# Patient Record
Sex: MALE | Race: WHITE | NOT HISPANIC OR LATINO
[De-identification: names, ages, dates, MRNs, and addresses within clinical notes are randomized per-mention and may not be internally consistent; named-entity substitution may affect disease eponyms.]

---

## 2019-09-13 ENCOUNTER — APPOINTMENT (OUTPATIENT)
Dept: INTERNAL MEDICINE | Facility: CLINIC | Age: 64
End: 2019-09-13
Payer: MEDICARE

## 2019-09-13 ENCOUNTER — RX RENEWAL (OUTPATIENT)
Age: 64
End: 2019-09-13

## 2019-09-13 ENCOUNTER — NON-APPOINTMENT (OUTPATIENT)
Age: 64
End: 2019-09-13

## 2019-09-13 VITALS
HEIGHT: 65 IN | TEMPERATURE: 97.9 F | WEIGHT: 174 LBS | HEART RATE: 87 BPM | RESPIRATION RATE: 16 BRPM | BODY MASS INDEX: 28.99 KG/M2 | OXYGEN SATURATION: 98 %

## 2019-09-13 DIAGNOSIS — Z82.61 FAMILY HISTORY OF ARTHRITIS: ICD-10-CM

## 2019-09-13 DIAGNOSIS — Z80.51 FAMILY HISTORY OF MALIGNANT NEOPLASM OF KIDNEY: ICD-10-CM

## 2019-09-13 PROCEDURE — 93000 ELECTROCARDIOGRAM COMPLETE: CPT

## 2019-09-13 PROCEDURE — G0444 DEPRESSION SCREEN ANNUAL: CPT | Mod: 59

## 2019-09-13 PROCEDURE — 36415 COLL VENOUS BLD VENIPUNCTURE: CPT

## 2019-09-13 PROCEDURE — G0439: CPT

## 2019-09-13 PROCEDURE — G0402 INITIAL PREVENTIVE EXAM: CPT

## 2019-09-13 PROCEDURE — G0403: CPT

## 2019-09-13 RX ORDER — OMEPRAZOLE 40 MG/1
CAPSULE, DELAYED RELEASE ORAL
Refills: 0 | Status: ACTIVE | COMMUNITY

## 2019-09-13 NOTE — HISTORY OF PRESENT ILLNESS
[FreeTextEntry1] : cpx  [de-identified] : fasting glu's in 200's--was seeing endo ([facility])--last hba1c 7.7--prev 8-10--prior ETT 1 yrs ago for elevated cor philip score--uses novolog 14-16 units  ac meals and 50units lantus qhs--gets occ overnight hypoglycemia--see's podiatry ([facility])--on insulin since 1999

## 2019-09-13 NOTE — PHYSICAL EXAM
[No Acute Distress] : no acute distress [Well Nourished] : well nourished [Well Developed] : well developed [Normal Sclera/Conjunctiva] : normal sclera/conjunctiva [Well-Appearing] : well-appearing [PERRL] : pupils equal round and reactive to light [EOMI] : extraocular movements intact [Normal Outer Ear/Nose] : the outer ears and nose were normal in appearance [Normal Oropharynx] : the oropharynx was normal [No JVD] : no jugular venous distention [No Lymphadenopathy] : no lymphadenopathy [Supple] : supple [Thyroid Normal, No Nodules] : the thyroid was normal and there were no nodules present [No Respiratory Distress] : no respiratory distress  [No Accessory Muscle Use] : no accessory muscle use [Normal Rate] : normal rate  [Clear to Auscultation] : lungs were clear to auscultation bilaterally [Regular Rhythm] : with a regular rhythm [Normal S1, S2] : normal S1 and S2 [No Carotid Bruits] : no carotid bruits [No Murmur] : no murmur heard [No Abdominal Bruit] : a ~M bruit was not heard ~T in the abdomen [No Varicosities] : no varicosities [Pedal Pulses Present] : the pedal pulses are present [No Palpable Aorta] : no palpable aorta [No Edema] : there was no peripheral edema [Soft] : abdomen soft [No Extremity Clubbing/Cyanosis] : no extremity clubbing/cyanosis [Non Tender] : non-tender [Non-distended] : non-distended [No Masses] : no abdominal mass palpated [No HSM] : no HSM [Normal Posterior Cervical Nodes] : no posterior cervical lymphadenopathy [Normal Bowel Sounds] : normal bowel sounds [Normal Anterior Cervical Nodes] : no anterior cervical lymphadenopathy [No CVA Tenderness] : no CVA  tenderness [No Joint Swelling] : no joint swelling [No Spinal Tenderness] : no spinal tenderness [Grossly Normal Strength/Tone] : grossly normal strength/tone [Coordination Grossly Intact] : coordination grossly intact [No Rash] : no rash [No Focal Deficits] : no focal deficits [Normal Gait] : normal gait [Normal Affect] : the affect was normal [Deep Tendon Reflexes (DTR)] : deep tendon reflexes were 2+ and symmetric [Normal Insight/Judgement] : insight and judgment were intact [Declined Rectal Exam] : declined rectal exam

## 2019-09-13 NOTE — HEALTH RISK ASSESSMENT
[No] : No [No falls in past year] : Patient reported no falls in the past year [0] : 2) Feeling down, depressed, or hopeless: Not at all (0) [No Retinopathy] : No retinopathy [With Significant Other] : lives with significant other [] :  [Fully functional (bathing, dressing, toileting, transferring, walking, feeding)] : Fully functional (bathing, dressing, toileting, transferring, walking, feeding) [Fully functional (using the telephone, shopping, preparing meals, housekeeping, doing laundry, using] : Fully functional and needs no help or supervision to perform IADLs (using the telephone, shopping, preparing meals, housekeeping, doing laundry, using transportation, managing medications and managing finances) [Reports normal functional visual acuity (ie: able to read med bottle)] : Reports normal functional visual acuity [With Patient/Caregiver] : With Patient/Caregiver [] : No [de-identified] : endo [[id number]] : 0 [EyeExamDate] : 11/19 [Change in mental status noted] : No change in mental status noted [Reports changes in hearing] : Reports no changes in hearing [Reports changes in vision] : Reports no changes in vision [Reports changes in dental health] : Reports no changes in dental health [ColonoscopyDate] : 6/07 [ColonoscopyComments] : sarah [AdvancecareDate] : 9/19

## 2019-09-16 ENCOUNTER — RX RENEWAL (OUTPATIENT)
Age: 64
End: 2019-09-16

## 2019-09-16 LAB
APPEARANCE: CLEAR
BILIRUBIN URINE: NEGATIVE
BLOOD URINE: NEGATIVE
COLOR: YELLOW
GLUCOSE QUALITATIVE U: ABNORMAL
KETONES URINE: NEGATIVE
LEUKOCYTE ESTERASE URINE: NEGATIVE
NITRITE URINE: NEGATIVE
PH URINE: 5.5
PROTEIN URINE: NORMAL
SPECIFIC GRAVITY URINE: 1.03
UROBILINOGEN URINE: NORMAL

## 2019-10-24 ENCOUNTER — APPOINTMENT (OUTPATIENT)
Dept: ENDOCRINOLOGY | Facility: CLINIC | Age: 64
End: 2019-10-24
Payer: MEDICARE

## 2019-10-24 VITALS
DIASTOLIC BLOOD PRESSURE: 76 MMHG | SYSTOLIC BLOOD PRESSURE: 124 MMHG | TEMPERATURE: 97.7 F | HEIGHT: 65 IN | WEIGHT: 170 LBS | HEART RATE: 90 BPM | BODY MASS INDEX: 28.32 KG/M2 | OXYGEN SATURATION: 98 %

## 2019-10-24 DIAGNOSIS — Z83.3 FAMILY HISTORY OF DIABETES MELLITUS: ICD-10-CM

## 2019-10-24 PROCEDURE — 95250 CONT GLUC MNTR PHYS/QHP EQP: CPT

## 2019-10-24 RX ORDER — SIMVASTATIN 80 MG/1
TABLET, FILM COATED ORAL
Refills: 0 | Status: COMPLETED | COMMUNITY
End: 2019-10-24

## 2019-10-25 NOTE — HISTORY OF PRESENT ILLNESS
[FreeTextEntry1] : 64 yr M with DM2 uncontrolled with no known complications here for initial visit. \par \par DM2 was diagnosed in 1995\par Has been on insulin for many years\par Current regimen is metformin 1 g BID, Lantus 50 units daily at night, and Novolog 16 Units (if glucose is 200 and below), 18 units (if glucose 250) and 20 Units (if glucose is greater than 250)\par Has not seen endocrinologist in past year- used to see endo Dr Baum\par A1C has been in 8-10 range as per patient over the past year.\par No retinopathy. Sees optho every year. \par No neuropathy.\par \par History of DM2 in father.\par No hospitalizations for DM. \par FS (does not always check) \par AM mid 200s\par prelunch 180s\par predinner 180s\par No hypoglycemia but does have periods of sweating at night when he does not check FS. \par \par Diet:\par Breakfast: Rye toast, 2 eggs and sausage\par Lunch: salad, soup\par Dinner: chicken, fish, veggies, baked potato\par Snacks: ice-cream, cookies\par No soda or juice. \par \par Exercise: walks 1 hour 3 x week.\par He was prescribed Rachel in the past but never learned how to use it. \par \par \par

## 2019-10-25 NOTE — PHYSICAL EXAM
[No Acute Distress] : no acute distress [Alert] : alert [Normal Hearing] : hearing was normal [Normal Outer Ear/Nose] : the ears and nose were normal in appearance [Normal Sclera/Conjunctiva] : normal sclera/conjunctiva [No Neck Mass] : no neck mass was observed [Supple] : the neck was supple [No Respiratory Distress] : no respiratory distress [Thyroid Not Enlarged] : the thyroid was not enlarged [Normal Rate and Effort] : normal respiratory rhythm and effort [Clear to Auscultation] : lungs were clear to auscultation bilaterally [Normal Rate] : heart rate was normal  [Normal S1, S2] : normal S1 and S2 [Normal Gait] : normal gait [Regular Rhythm] : with a regular rhythm [No Joint Swelling] : no joint swelling seen [Right Foot Was Examined] : right foot ~C was examined [Left Foot Was Examined] : left foot ~C was examined [Normal] : normal [Vibration Dec.] : diminished vibratory sensation at the level of the toes [2+] : 2+ in the dorsalis pedis [Oriented x3] : oriented to person, place, and time [Position Sense Dec.] : diminished position sense at the level of the toes [Normal Mood] : the mood was normal [Normal Affect] : the affect was normal [Swelling] : not swollen [de-identified] : +Lipohypertrophy L arm [de-identified] : mild tremor

## 2019-10-25 NOTE — ASSESSMENT
[FreeTextEntry1] : 64 yr old M with Type 2 DM uncontrolled and HLD\par 1) DM2\par -Patient was counselled on lifestyle modification including carb consistent diet and incorporation of physical activity\par -Will refer to Nutritionist\par -Do not have enough glucose data at this time to make changes as patient did not bring log or meter\par -He will return for Free Style Rachel placement on Nov 7th\par -For now can continue current regimen of metformin, Lantus and Humalog\par -Will check HbA1C\par -Patient noted to have lipohypertrophy in L arm as a result of improper injection technique\par -He was instructed on how to properly administer insulin\par -Foot Exam done today was normal \par \par 2) HLD- LDL goal <100\par Patient is currently on simvastatin 10mg daily\par  Oct 2019 \par Recommend change to atorvastatin 10mg daily.\par Discussed with PCP Dr Pickering.\par \par 3) HTN \par Controlled.\par Micro/Creat ratio 12 Oct 2019\par \par \par Follow up in 2 months

## 2019-10-25 NOTE — REVIEW OF SYSTEMS
[Polyuria] : polyuria [All other systems negative] : All other systems negative [Fatigue] : no fatigue [Decreased Appetite] : appetite not decreased [Visual Field Defect] : no visual field defect [Blurry Vision] : no blurred vision [Dysphagia] : no dysphagia [Dysphonia] : no dysphonia [Chest Pain] : no chest pain [Palpitations] : no palpitations [Shortness Of Breath] : no shortness of breath [Wheezing] : no wheezing was heard [Nausea] : no nausea [Vomiting] : no vomiting was observed [Joint Pain] : no joint pain [Joint Stiffness] : no joint stiffness [Acanthosis] : no acanthosis  [Hirsutism] : no hirsutism [Depression] : no depression [Anxiety] : no anxiety [Cold Intolerance] : cold tolerant [Heat Intolerance] : heat tolerant [Easy Bleeding] : no ~M tendency for easy bleeding [Easy Bruising] : no tendency for easy bruising

## 2019-10-31 LAB
ESTIMATED AVERAGE GLUCOSE: 223 MG/DL
HBA1C MFR BLD HPLC: 9.4 %

## 2019-11-01 ENCOUNTER — RECORD ABSTRACTING (OUTPATIENT)
Age: 64
End: 2019-11-01

## 2019-11-20 ENCOUNTER — MEDICATION RENEWAL (OUTPATIENT)
Age: 64
End: 2019-11-20

## 2019-12-12 ENCOUNTER — MEDICATION RENEWAL (OUTPATIENT)
Age: 64
End: 2019-12-12

## 2019-12-13 ENCOUNTER — RX RENEWAL (OUTPATIENT)
Age: 64
End: 2019-12-13

## 2019-12-18 ENCOUNTER — RX RENEWAL (OUTPATIENT)
Age: 64
End: 2019-12-18

## 2020-01-07 ENCOUNTER — APPOINTMENT (OUTPATIENT)
Dept: ENDOCRINOLOGY | Facility: CLINIC | Age: 65
End: 2020-01-07
Payer: MEDICARE

## 2020-01-07 PROCEDURE — ZZZZZ: CPT

## 2020-01-16 ENCOUNTER — APPOINTMENT (OUTPATIENT)
Dept: ENDOCRINOLOGY | Facility: CLINIC | Age: 65
End: 2020-01-16
Payer: MEDICARE

## 2020-01-16 VITALS
WEIGHT: 172 LBS | OXYGEN SATURATION: 97 % | HEART RATE: 86 BPM | BODY MASS INDEX: 28.66 KG/M2 | SYSTOLIC BLOOD PRESSURE: 128 MMHG | DIASTOLIC BLOOD PRESSURE: 72 MMHG | HEIGHT: 65 IN | TEMPERATURE: 98 F

## 2020-01-16 LAB — HBA1C MFR BLD HPLC: 10.7

## 2020-01-16 PROCEDURE — 99214 OFFICE O/P EST MOD 30 MIN: CPT

## 2020-01-16 PROCEDURE — 83036 HEMOGLOBIN GLYCOSYLATED A1C: CPT | Mod: QW

## 2020-01-16 RX ORDER — SIMVASTATIN 10 MG/1
10 TABLET, FILM COATED ORAL
Refills: 0 | Status: COMPLETED | COMMUNITY
End: 2020-01-16

## 2020-01-18 NOTE — REVIEW OF SYSTEMS
[Polyuria] : polyuria [All other systems negative] : All other systems negative [Fatigue] : no fatigue [Decreased Appetite] : appetite not decreased [Blurry Vision] : no blurred vision [Visual Field Defect] : no visual field defect [Palpitations] : no palpitations [Dysphonia] : no dysphonia [Dysphagia] : no dysphagia [Chest Pain] : no chest pain [Shortness Of Breath] : no shortness of breath [Nausea] : no nausea [Wheezing] : no wheezing was heard [Joint Pain] : no joint pain [Joint Stiffness] : no joint stiffness [Vomiting] : no vomiting was observed [Depression] : no depression [Hirsutism] : no hirsutism [Acanthosis] : no acanthosis  [Easy Bleeding] : no ~M tendency for easy bleeding [Anxiety] : no anxiety [Heat Intolerance] : heat tolerant [Cold Intolerance] : cold tolerant [Easy Bruising] : no tendency for easy bruising

## 2020-01-18 NOTE — ASSESSMENT
[FreeTextEntry1] : 64 yr old M with Type 2 DM uncontrolled A1C 10.7 and HLD\par 1) DM2\par -Patient was counselled on lifestyle modification including carb consistent diet and incorporation of physical activity\par -Encouraged healthier snacks during the night and 15 minute walk after dinner\par -He will make appt with Nutritionist\par -Can continue current regimen of metformin, will increase Lantus to 55 Units HS and continue Humalog correctional scale\par -Patient will make f/u appt with optho\par -Foot Exam done was normal \par \par 2) HLD- LDL goal <100\par  Oct 2019 \par Recommend continue atorvastatin 10mg daily.\par Will check Lipid panel today\par \par 3) HTN \par Controlled.\par Micro/Creat ratio 12 Oct 2019\par \par \par

## 2020-01-18 NOTE — HISTORY OF PRESENT ILLNESS
[FreeTextEntry1] : 64 yr M with DM2 uncontrolled with no known complications here for follow up. \par \par DM2 was diagnosed in 1995\par Has been on insulin for many years\par Current regimen is metformin 1 g BID, Lantus 50 units daily at night, and Novolog 16 Units (if glucose is 200 and below), 18 units (if glucose 250) and 20 Units (if glucose is greater than 250)\par A1C has been in 8-10 range as per patient over the past year. A1C today was 10.7.\par No retinopathy. Saw optho last year. \par No neuropathy.\par \par History of DM2 in father.\par No hospitalizations for DM. \par Patient uses Free Style Rachel to monitor FS\par -230\par prelunch 150-180\par predinner 150-180\par -300\par \par Has rare hypoglycemia.\par \par Diet:\par Breakfast: Rye toast, 2 eggs and sausage\par Lunch: salad, soup\par Dinner: chicken, fish, veggies, baked potato\par Snacks: ice-cream, cookies usually late at night (will give Humalog 10 Units coverage before snacks)\par No soda or juice. \par \par Exercise:Has not been exercising.\par  \par \par \par

## 2020-01-18 NOTE — PHYSICAL EXAM
[Alert] : alert [No Acute Distress] : no acute distress [Normal Sclera/Conjunctiva] : normal sclera/conjunctiva [Normal Outer Ear/Nose] : the ears and nose were normal in appearance [Normal Hearing] : hearing was normal [No Neck Mass] : no neck mass was observed [Supple] : the neck was supple [Thyroid Not Enlarged] : the thyroid was not enlarged [No Respiratory Distress] : no respiratory distress [Normal Rate and Effort] : normal respiratory rhythm and effort [Clear to Auscultation] : lungs were clear to auscultation bilaterally [Normal Rate] : heart rate was normal  [Normal S1, S2] : normal S1 and S2 [Regular Rhythm] : with a regular rhythm [Normal Gait] : normal gait [No Joint Swelling] : no joint swelling seen [Right Foot Was Examined] : right foot ~C was examined [Left Foot Was Examined] : left foot ~C was examined [Normal] : normal [Vibration Dec.] : diminished vibratory sensation at the level of the toes [Position Sense Dec.] : diminished position sense at the level of the toes [Oriented x3] : oriented to person, place, and time [Normal Affect] : the affect was normal [Normal Mood] : the mood was normal [Swelling] : not swollen [de-identified] : +Lipohypertrophy L arm [de-identified] : mild tremor

## 2020-01-21 ENCOUNTER — APPOINTMENT (OUTPATIENT)
Dept: ENDOCRINOLOGY | Facility: CLINIC | Age: 65
End: 2020-01-21
Payer: MEDICARE

## 2020-01-21 PROCEDURE — ZZZZZ: CPT

## 2020-01-22 LAB
CHOLEST SERPL-MCNC: 250 MG/DL
CHOLEST/HDLC SERPL: 3.7 RATIO
HDLC SERPL-MCNC: 68 MG/DL
LDLC SERPL CALC-MCNC: 158 MG/DL
TRIGL SERPL-MCNC: 123 MG/DL

## 2020-01-30 ENCOUNTER — TRANSCRIPTION ENCOUNTER (OUTPATIENT)
Age: 65
End: 2020-01-30

## 2020-01-30 RX ORDER — INSULIN ASPART 100 [IU]/ML
INJECTION, SOLUTION INTRAVENOUS; SUBCUTANEOUS
Refills: 0 | Status: COMPLETED | COMMUNITY
End: 2020-01-30

## 2020-03-25 ENCOUNTER — APPOINTMENT (OUTPATIENT)
Dept: ENDOCRINOLOGY | Facility: CLINIC | Age: 65
End: 2020-03-25
Payer: MEDICARE

## 2020-03-25 PROCEDURE — G2012 BRIEF CHECK IN BY MD/QHP: CPT

## 2020-03-25 RX ORDER — ISOPROPYL ALCOHOL 70 ML/100ML
SWAB TOPICAL
Qty: 1 | Refills: 3 | Status: ACTIVE | COMMUNITY
Start: 2020-03-25 | End: 1900-01-01

## 2020-04-30 ENCOUNTER — TRANSCRIPTION ENCOUNTER (OUTPATIENT)
Age: 65
End: 2020-04-30

## 2020-05-01 ENCOUNTER — TRANSCRIPTION ENCOUNTER (OUTPATIENT)
Age: 65
End: 2020-05-01

## 2020-05-13 ENCOUNTER — TRANSCRIPTION ENCOUNTER (OUTPATIENT)
Age: 65
End: 2020-05-13

## 2020-07-21 ENCOUNTER — TRANSCRIPTION ENCOUNTER (OUTPATIENT)
Age: 65
End: 2020-07-21

## 2020-08-29 DIAGNOSIS — Z82.49 FAMILY HISTORY OF ISCHEMIC HEART DISEASE AND OTHER DISEASES OF THE CIRCULATORY SYSTEM: ICD-10-CM

## 2020-09-03 ENCOUNTER — TRANSCRIPTION ENCOUNTER (OUTPATIENT)
Age: 65
End: 2020-09-03

## 2020-09-04 ENCOUNTER — TRANSCRIPTION ENCOUNTER (OUTPATIENT)
Age: 65
End: 2020-09-04

## 2020-09-15 ENCOUNTER — APPOINTMENT (OUTPATIENT)
Dept: INTERNAL MEDICINE | Facility: CLINIC | Age: 65
End: 2020-09-15
Payer: MEDICARE

## 2020-09-15 ENCOUNTER — TRANSCRIPTION ENCOUNTER (OUTPATIENT)
Age: 65
End: 2020-09-15

## 2020-09-15 VITALS
HEIGHT: 65 IN | OXYGEN SATURATION: 98 % | SYSTOLIC BLOOD PRESSURE: 136 MMHG | WEIGHT: 173 LBS | BODY MASS INDEX: 28.82 KG/M2 | HEART RATE: 94 BPM | TEMPERATURE: 98.1 F | RESPIRATION RATE: 16 BRPM | DIASTOLIC BLOOD PRESSURE: 73 MMHG

## 2020-09-15 DIAGNOSIS — N20.0 CALCULUS OF KIDNEY: ICD-10-CM

## 2020-09-15 PROCEDURE — 99213 OFFICE O/P EST LOW 20 MIN: CPT

## 2020-09-15 RX ORDER — TRANSPARENT DRESSING 2"X2.75"
BANDAGE TOPICAL
Qty: 1 | Refills: 0 | Status: ACTIVE | COMMUNITY
Start: 2020-09-15 | End: 1900-01-01

## 2020-09-15 NOTE — PLAN
[FreeTextEntry1] : heating pad, stretching\par if no improvement- f/u ortho for further eval\par \par educated pt that if sx worsen or new sx develop including radiating to groin or down leg, n/v, urinary sx, then to rto or call to discuss\par \par pt understands and agrees with above tx and plan

## 2020-09-15 NOTE — HISTORY OF PRESENT ILLNESS
[FreeTextEntry8] : 66 yo male c/o right lower back/hip pain x5 days without radiation.\par has not tried anything for symptoms\par hot showers with minimal relief\par has hx of right leg being shorter than the left 38 years ago- uses orthotics\par 37 years ago- kidney stone\par \par denies urinary symptoms, abd sx, radiating sx

## 2020-10-01 ENCOUNTER — TRANSCRIPTION ENCOUNTER (OUTPATIENT)
Age: 65
End: 2020-10-01

## 2020-11-02 ENCOUNTER — TRANSCRIPTION ENCOUNTER (OUTPATIENT)
Age: 65
End: 2020-11-02

## 2020-11-03 ENCOUNTER — APPOINTMENT (OUTPATIENT)
Dept: INTERNAL MEDICINE | Facility: CLINIC | Age: 65
End: 2020-11-03
Payer: MEDICARE

## 2020-11-03 ENCOUNTER — MED ADMIN CHARGE (OUTPATIENT)
Age: 65
End: 2020-11-03

## 2020-11-03 ENCOUNTER — NON-APPOINTMENT (OUTPATIENT)
Age: 65
End: 2020-11-03

## 2020-11-03 ENCOUNTER — LABORATORY RESULT (OUTPATIENT)
Age: 65
End: 2020-11-03

## 2020-11-03 VITALS
RESPIRATION RATE: 16 BRPM | WEIGHT: 174 LBS | HEART RATE: 81 BPM | HEIGHT: 65 IN | BODY MASS INDEX: 28.99 KG/M2 | DIASTOLIC BLOOD PRESSURE: 76 MMHG | TEMPERATURE: 98.3 F | SYSTOLIC BLOOD PRESSURE: 125 MMHG

## 2020-11-03 DIAGNOSIS — Z23 ENCOUNTER FOR IMMUNIZATION: ICD-10-CM

## 2020-11-03 DIAGNOSIS — R79.89 OTHER SPECIFIED ABNORMAL FINDINGS OF BLOOD CHEMISTRY: ICD-10-CM

## 2020-11-03 PROCEDURE — G0009: CPT

## 2020-11-03 PROCEDURE — G0008: CPT

## 2020-11-03 PROCEDURE — 90732 PPSV23 VACC 2 YRS+ SUBQ/IM: CPT

## 2020-11-03 PROCEDURE — 36415 COLL VENOUS BLD VENIPUNCTURE: CPT

## 2020-11-03 PROCEDURE — 90688 IIV4 VACCINE SPLT 0.5 ML IM: CPT

## 2020-11-03 PROCEDURE — G0439: CPT

## 2020-11-03 PROCEDURE — 93000 ELECTROCARDIOGRAM COMPLETE: CPT | Mod: 59

## 2020-11-03 RX ORDER — AMOXICILLIN 500 MG/1
500 CAPSULE ORAL
Qty: 30 | Refills: 0 | Status: DISCONTINUED | COMMUNITY
Start: 2020-03-27

## 2020-11-03 NOTE — HISTORY OF PRESENT ILLNESS
[FreeTextEntry1] : cpx [de-identified] : 64 yo male here for annual wellness exam, pt complaining of extreme fatigue that is worsening, states that it takes him 2 hours to get out of bed in the morning. walking also causes extreme tiredness so he is not walking anymore. \par states that a1c is most likely going to be elevated\par call home number [phone]) area code first\par

## 2020-11-03 NOTE — HEALTH RISK ASSESSMENT
[No] : In the past 12 months have you used drugs other than those required for medical reasons? No [No falls in past year] : Patient reported no falls in the past year [0] : 2) Feeling down, depressed, or hopeless: Not at all (0) [Patient reported colonoscopy was normal] : Patient reported colonoscopy was normal [Fully functional (bathing, dressing, toileting, transferring, walking, feeding)] : Fully functional (bathing, dressing, toileting, transferring, walking, feeding) [Fully functional (using the telephone, shopping, preparing meals, housekeeping, doing laundry, using] : Fully functional and needs no help or supervision to perform IADLs (using the telephone, shopping, preparing meals, housekeeping, doing laundry, using transportation, managing medications and managing finances) [] : No [Audit-CScore] : 0 [[id number]] : 0 [ColonoscopyDate] : 01/06

## 2020-11-03 NOTE — PHYSICAL EXAM
[Normal] : normal gait, coordination grossly intact, no focal deficits [de-identified] : ventral hernia

## 2020-11-10 ENCOUNTER — TRANSCRIPTION ENCOUNTER (OUTPATIENT)
Age: 65
End: 2020-11-10

## 2020-11-10 DIAGNOSIS — Z86.39 PERSONAL HISTORY OF OTHER ENDOCRINE, NUTRITIONAL AND METABOLIC DISEASE: ICD-10-CM

## 2020-11-10 LAB
25(OH)D3 SERPL-MCNC: 37 NG/ML
ALBUMIN SERPL ELPH-MCNC: 4.9 G/DL
ALP BLD-CCNC: 128 U/L
ALT SERPL-CCNC: 17 U/L
ANA SER IF-ACNC: NEGATIVE
ANION GAP SERPL CALC-SCNC: 19 MMOL/L
APPEARANCE: CLEAR
AST SERPL-CCNC: 17 U/L
B BURGDOR AB SER-IMP: NEGATIVE
B BURGDOR IGM PATRN SER IB-IMP: NEGATIVE
B BURGDOR18KD IGG SER QL IB: NORMAL
B BURGDOR23KD IGG SER QL IB: NORMAL
B BURGDOR23KD IGM SER QL IB: NORMAL
B BURGDOR28KD IGG SER QL IB: NORMAL
B BURGDOR30KD IGG SER QL IB: PRESENT
B BURGDOR31KD IGG SER QL IB: NORMAL
B BURGDOR39KD IGG SER QL IB: NORMAL
B BURGDOR39KD IGM SER QL IB: NORMAL
B BURGDOR41KD IGG SER QL IB: PRESENT
B BURGDOR41KD IGM SER QL IB: NORMAL
B BURGDOR45KD IGG SER QL IB: NORMAL
B BURGDOR58KD IGG SER QL IB: NORMAL
B BURGDOR66KD IGG SER QL IB: PRESENT
B BURGDOR93KD IGG SER QL IB: NORMAL
BACTERIA: ABNORMAL
BASOPHILS # BLD AUTO: 0.05 K/UL
BASOPHILS NFR BLD AUTO: 0.7 %
BILIRUB SERPL-MCNC: 0.3 MG/DL
BILIRUBIN URINE: NEGATIVE
BLOOD URINE: NEGATIVE
BUN SERPL-MCNC: 16 MG/DL
CALCIUM SERPL-MCNC: 10.3 MG/DL
CHLORIDE SERPL-SCNC: 100 MMOL/L
CHOLEST SERPL-MCNC: 200 MG/DL
CO2 SERPL-SCNC: 23 MMOL/L
COLOR: YELLOW
CREAT SERPL-MCNC: 0.94 MG/DL
CREAT SPEC-SCNC: 207 MG/DL
EOSINOPHIL # BLD AUTO: 0.35 K/UL
EOSINOPHIL NFR BLD AUTO: 5.2 %
ESTIMATED AVERAGE GLUCOSE: 223 MG/DL
FERRITIN SERPL-MCNC: 28 NG/ML
FOLATE SERPL-MCNC: 9.6 NG/ML
GLUCOSE QUALITATIVE U: NEGATIVE
GLUCOSE SERPL-MCNC: 106 MG/DL
HBA1C MFR BLD HPLC: 9.4 %
HCT VFR BLD CALC: 48.4 %
HCV AB SER QL: NONREACTIVE
HCV S/CO RATIO: 0.08 S/CO
HDLC SERPL-MCNC: 71 MG/DL
HGB BLD-MCNC: 15.4 G/DL
HYALINE CASTS: 0 /LPF
IMM GRANULOCYTES NFR BLD AUTO: 0.9 %
KETONES URINE: NEGATIVE
LDLC SERPL CALC-MCNC: 107 MG/DL
LEUKOCYTE ESTERASE URINE: NEGATIVE
LYMPHOCYTES # BLD AUTO: 1.67 K/UL
LYMPHOCYTES NFR BLD AUTO: 24.7 %
MAN DIFF?: NORMAL
MCHC RBC-ENTMCNC: 28.8 PG
MCHC RBC-ENTMCNC: 31.8 GM/DL
MCV RBC AUTO: 90.6 FL
MICROALBUMIN 24H UR DL<=1MG/L-MCNC: 2.2 MG/DL
MICROALBUMIN/CREAT 24H UR-RTO: 11 MG/G
MICROSCOPIC-UA: NORMAL
MONOCYTES # BLD AUTO: 0.71 K/UL
MONOCYTES NFR BLD AUTO: 10.5 %
NEUTROPHILS # BLD AUTO: 3.93 K/UL
NEUTROPHILS NFR BLD AUTO: 58 %
NITRITE URINE: NEGATIVE
NONHDLC SERPL-MCNC: 129 MG/DL
PH URINE: 5.5
PLATELET # BLD AUTO: 314 K/UL
POTASSIUM SERPL-SCNC: 5.1 MMOL/L
PROT SERPL-MCNC: 7.1 G/DL
PROTEIN URINE: NORMAL
PSA FREE FLD-MCNC: 22 %
PSA FREE SERPL-MCNC: 1.04 NG/ML
PSA SERPL-MCNC: 4.68 NG/ML
RBC # BLD: 5.34 M/UL
RBC # FLD: 13.8 %
RED BLOOD CELLS URINE: 2 /HPF
RHEUMATOID FACT SER QL: <10 IU/ML
SODIUM SERPL-SCNC: 142 MMOL/L
SPECIFIC GRAVITY URINE: 1.03
SQUAMOUS EPITHELIAL CELLS: 3 /HPF
TRIGL SERPL-MCNC: 109 MG/DL
TSH SERPL-ACNC: 2.56 UIU/ML
UROBILINOGEN URINE: NORMAL
VIT B12 SERPL-MCNC: 680 PG/ML
WBC # FLD AUTO: 6.77 K/UL
WHITE BLOOD CELLS URINE: 1 /HPF

## 2020-11-16 LAB
TESTOST BND SERPL-MCNC: 6.8 PG/ML
TESTOST SERPL-MCNC: 400.5 NG/DL

## 2020-11-18 ENCOUNTER — TRANSCRIPTION ENCOUNTER (OUTPATIENT)
Age: 65
End: 2020-11-18

## 2020-11-19 ENCOUNTER — TRANSCRIPTION ENCOUNTER (OUTPATIENT)
Age: 65
End: 2020-11-19

## 2020-11-23 ENCOUNTER — TRANSCRIPTION ENCOUNTER (OUTPATIENT)
Age: 65
End: 2020-11-23

## 2020-11-23 ENCOUNTER — APPOINTMENT (OUTPATIENT)
Dept: ENDOCRINOLOGY | Facility: CLINIC | Age: 65
End: 2020-11-23
Payer: MEDICARE

## 2020-11-23 PROCEDURE — G0108 DIAB MANAGE TRN  PER INDIV: CPT

## 2020-12-02 ENCOUNTER — TRANSCRIPTION ENCOUNTER (OUTPATIENT)
Age: 65
End: 2020-12-02

## 2020-12-04 ENCOUNTER — RX RENEWAL (OUTPATIENT)
Age: 65
End: 2020-12-04

## 2020-12-05 ENCOUNTER — RX RENEWAL (OUTPATIENT)
Age: 65
End: 2020-12-05

## 2020-12-14 ENCOUNTER — TRANSCRIPTION ENCOUNTER (OUTPATIENT)
Age: 65
End: 2020-12-14

## 2020-12-25 ENCOUNTER — TRANSCRIPTION ENCOUNTER (OUTPATIENT)
Age: 65
End: 2020-12-25

## 2021-01-11 ENCOUNTER — RX RENEWAL (OUTPATIENT)
Age: 66
End: 2021-01-11

## 2021-01-18 ENCOUNTER — APPOINTMENT (OUTPATIENT)
Dept: INTERNAL MEDICINE | Facility: CLINIC | Age: 66
End: 2021-01-18
Payer: MEDICARE

## 2021-01-18 VITALS
WEIGHT: 179 LBS | OXYGEN SATURATION: 99 % | HEIGHT: 65 IN | HEART RATE: 72 BPM | RESPIRATION RATE: 16 BRPM | BODY MASS INDEX: 29.82 KG/M2 | TEMPERATURE: 97.6 F

## 2021-01-18 VITALS — DIASTOLIC BLOOD PRESSURE: 68 MMHG | SYSTOLIC BLOOD PRESSURE: 126 MMHG

## 2021-01-18 DIAGNOSIS — M79.671 PAIN IN RIGHT FOOT: ICD-10-CM

## 2021-01-18 PROCEDURE — 99214 OFFICE O/P EST MOD 30 MIN: CPT

## 2021-01-18 RX ORDER — BUPROPION HYDROCHLORIDE 150 MG/1
150 TABLET, EXTENDED RELEASE ORAL
Refills: 0 | Status: DISCONTINUED | COMMUNITY
End: 2021-01-18

## 2021-01-18 NOTE — HISTORY OF PRESENT ILLNESS
[FreeTextEntry1] : f/u diabetes [de-identified] : seeing endo--reviewed notes\par saw ROHINI ([facility]) for elevated PSA\par taking 50-60 total novolog/day and 50u lantus qhs\par general fatigue for years--snores but denies observed cessation of breathing

## 2021-01-18 NOTE — ASSESSMENT
[FreeTextEntry1] : podiatry eval\par consider sleep apnea eval\par home bp cuff monitoring\par advised to f/u with endo

## 2021-03-01 ENCOUNTER — TRANSCRIPTION ENCOUNTER (OUTPATIENT)
Age: 66
End: 2021-03-01

## 2021-03-23 ENCOUNTER — RX RENEWAL (OUTPATIENT)
Age: 66
End: 2021-03-23

## 2021-05-24 ENCOUNTER — TRANSCRIPTION ENCOUNTER (OUTPATIENT)
Age: 66
End: 2021-05-24

## 2021-06-14 ENCOUNTER — APPOINTMENT (OUTPATIENT)
Dept: INTERNAL MEDICINE | Facility: CLINIC | Age: 66
End: 2021-06-14
Payer: MEDICARE

## 2021-06-14 VITALS
WEIGHT: 178 LBS | BODY MASS INDEX: 29.66 KG/M2 | HEART RATE: 83 BPM | TEMPERATURE: 98.5 F | HEIGHT: 65 IN | OXYGEN SATURATION: 98 %

## 2021-06-14 VITALS — SYSTOLIC BLOOD PRESSURE: 128 MMHG | DIASTOLIC BLOOD PRESSURE: 76 MMHG

## 2021-06-14 DIAGNOSIS — D22.9 MELANOCYTIC NEVI, UNSPECIFIED: ICD-10-CM

## 2021-06-14 PROCEDURE — 99213 OFFICE O/P EST LOW 20 MIN: CPT | Mod: 25

## 2021-06-14 PROCEDURE — 36415 COLL VENOUS BLD VENIPUNCTURE: CPT

## 2021-06-14 NOTE — HISTORY OF PRESENT ILLNESS
[FreeTextEntry1] : malaise [de-identified] : general malaise\par falls asleep occ during the day (denies occurrence during driving)--snores a lot--never went for sleep eval as advised\par meds--lantus, novolog, metformin\par to have jim checked\par denies overt cardiac symps\par saw  for elevated psa--notes reviewed\par prior on wellbutrin but pt denies depressive symps

## 2021-06-15 ENCOUNTER — TRANSCRIPTION ENCOUNTER (OUTPATIENT)
Age: 66
End: 2021-06-15

## 2021-06-15 LAB
ALBUMIN SERPL ELPH-MCNC: 4.7 G/DL
ALP BLD-CCNC: 129 U/L
ALT SERPL-CCNC: 26 U/L
ANION GAP SERPL CALC-SCNC: 15 MMOL/L
AST SERPL-CCNC: 23 U/L
BASOPHILS # BLD AUTO: 0.05 K/UL
BASOPHILS NFR BLD AUTO: 0.6 %
BILIRUB SERPL-MCNC: 0.6 MG/DL
BUN SERPL-MCNC: 15 MG/DL
CALCIUM SERPL-MCNC: 9.7 MG/DL
CHLORIDE SERPL-SCNC: 98 MMOL/L
CO2 SERPL-SCNC: 23 MMOL/L
CREAT SERPL-MCNC: 0.94 MG/DL
EOSINOPHIL # BLD AUTO: 0.2 K/UL
EOSINOPHIL NFR BLD AUTO: 2.4 %
ESTIMATED AVERAGE GLUCOSE: 217 MG/DL
GLUCOSE SERPL-MCNC: 193 MG/DL
HBA1C MFR BLD HPLC: 9.2 %
HCT VFR BLD CALC: 50.9 %
HGB BLD-MCNC: 16 G/DL
IMM GRANULOCYTES NFR BLD AUTO: 0.7 %
LYMPHOCYTES # BLD AUTO: 1.72 K/UL
LYMPHOCYTES NFR BLD AUTO: 20.5 %
MAN DIFF?: NORMAL
MCHC RBC-ENTMCNC: 28.4 PG
MCHC RBC-ENTMCNC: 31.4 GM/DL
MCV RBC AUTO: 90.2 FL
MONOCYTES # BLD AUTO: 0.67 K/UL
MONOCYTES NFR BLD AUTO: 8 %
NEUTROPHILS # BLD AUTO: 5.68 K/UL
NEUTROPHILS NFR BLD AUTO: 67.8 %
PLATELET # BLD AUTO: 323 K/UL
POTASSIUM SERPL-SCNC: 4.8 MMOL/L
PROT SERPL-MCNC: 7 G/DL
PSA FREE FLD-MCNC: 24 %
PSA FREE SERPL-MCNC: 0.87 NG/ML
PSA SERPL-MCNC: 3.59 NG/ML
RBC # BLD: 5.64 M/UL
RBC # FLD: 14.6 %
SODIUM SERPL-SCNC: 136 MMOL/L
WBC # FLD AUTO: 8.38 K/UL

## 2021-06-16 ENCOUNTER — APPOINTMENT (OUTPATIENT)
Dept: ENDOCRINOLOGY | Facility: CLINIC | Age: 66
End: 2021-06-16

## 2021-06-25 ENCOUNTER — TRANSCRIPTION ENCOUNTER (OUTPATIENT)
Age: 66
End: 2021-06-25

## 2021-06-29 ENCOUNTER — TRANSCRIPTION ENCOUNTER (OUTPATIENT)
Age: 66
End: 2021-06-29

## 2021-06-30 ENCOUNTER — TRANSCRIPTION ENCOUNTER (OUTPATIENT)
Age: 66
End: 2021-06-30

## 2021-07-01 ENCOUNTER — APPOINTMENT (OUTPATIENT)
Dept: INTERNAL MEDICINE | Facility: CLINIC | Age: 66
End: 2021-07-01

## 2021-07-01 ENCOUNTER — APPOINTMENT (OUTPATIENT)
Dept: ENDOCRINOLOGY | Facility: CLINIC | Age: 66
End: 2021-07-01

## 2021-07-14 ENCOUNTER — APPOINTMENT (OUTPATIENT)
Dept: DERMATOLOGY | Facility: CLINIC | Age: 66
End: 2021-07-14
Payer: MEDICARE

## 2021-07-14 VITALS — WEIGHT: 176 LBS | BODY MASS INDEX: 29.32 KG/M2 | HEIGHT: 65 IN

## 2021-07-14 DIAGNOSIS — L73.8 OTHER SPECIFIED FOLLICULAR DISORDERS: ICD-10-CM

## 2021-07-14 DIAGNOSIS — B07.8 OTHER VIRAL WARTS: ICD-10-CM

## 2021-07-14 PROCEDURE — 99203 OFFICE O/P NEW LOW 30 MIN: CPT | Mod: 25

## 2021-07-14 PROCEDURE — 17110 DESTRUCTION B9 LES UP TO 14: CPT

## 2021-07-14 NOTE — HISTORY OF PRESENT ILLNESS
[FreeTextEntry1] : scalp folliculitis lesion on L above lip [de-identified] : lesion above lip was bigger\par started few wk ago\par \par folliculitis recurrent x yrs

## 2021-07-26 ENCOUNTER — TRANSCRIPTION ENCOUNTER (OUTPATIENT)
Age: 66
End: 2021-07-26

## 2021-08-24 ENCOUNTER — TRANSCRIPTION ENCOUNTER (OUTPATIENT)
Age: 66
End: 2021-08-24

## 2021-09-01 ENCOUNTER — APPOINTMENT (OUTPATIENT)
Dept: ENDOCRINOLOGY | Facility: CLINIC | Age: 66
End: 2021-09-01
Payer: MEDICARE

## 2021-09-01 VITALS
OXYGEN SATURATION: 97 % | WEIGHT: 177 LBS | DIASTOLIC BLOOD PRESSURE: 83 MMHG | SYSTOLIC BLOOD PRESSURE: 148 MMHG | BODY MASS INDEX: 29.49 KG/M2 | HEART RATE: 97 BPM | HEIGHT: 65 IN

## 2021-09-01 PROCEDURE — 83036 HEMOGLOBIN GLYCOSYLATED A1C: CPT | Mod: QW

## 2021-09-01 PROCEDURE — 99214 OFFICE O/P EST MOD 30 MIN: CPT

## 2021-09-01 RX ORDER — CLINDAMYCIN PHOSPHATE 10 MG/ML
1 SOLUTION TOPICAL DAILY
Qty: 60 | Refills: 3 | Status: COMPLETED | COMMUNITY
Start: 2021-07-14 | End: 2021-09-01

## 2021-09-03 ENCOUNTER — NON-APPOINTMENT (OUTPATIENT)
Age: 66
End: 2021-09-03

## 2021-09-03 ENCOUNTER — APPOINTMENT (OUTPATIENT)
Dept: CARDIOLOGY | Facility: CLINIC | Age: 66
End: 2021-09-03
Payer: MEDICARE

## 2021-09-03 VITALS
OXYGEN SATURATION: 95 % | SYSTOLIC BLOOD PRESSURE: 146 MMHG | BODY MASS INDEX: 29.99 KG/M2 | HEIGHT: 65 IN | DIASTOLIC BLOOD PRESSURE: 76 MMHG | WEIGHT: 180 LBS | HEART RATE: 84 BPM

## 2021-09-03 DIAGNOSIS — I65.29 OCCLUSION AND STENOSIS OF UNSPECIFIED CAROTID ARTERY: ICD-10-CM

## 2021-09-03 PROCEDURE — 93000 ELECTROCARDIOGRAM COMPLETE: CPT

## 2021-09-03 PROCEDURE — 99204 OFFICE O/P NEW MOD 45 MIN: CPT

## 2021-09-06 NOTE — PHYSICAL EXAM
[Alert] : alert [No Acute Distress] : no acute distress [Normal Sclera/Conjunctiva] : normal sclera/conjunctiva [No Proptosis] : no proptosis [Normal Outer Ear/Nose] : the ears and nose were normal in appearance [Normal Hearing] : hearing was normal [No Neck Mass] : no neck mass was observed [No Respiratory Distress] : no respiratory distress [Clear to Auscultation] : lungs were clear to auscultation bilaterally [Normal S1, S2] : normal S1 and S2 [Normal Rate] : heart rate was normal [Soft] : abdomen soft [Not Tender] : non-tender [Normal Gait] : normal gait [No Rash] : no rash [Right Foot Was Examined] : right foot ~C was examined [Left Foot Was Examined] : left foot ~C was examined [Normal] : normal [2+] : 2+ in the dorsalis pedis [No Tremors] : no tremors [Oriented x3] : oriented to person, place, and time [Normal Affect] : the affect was normal [Normal Mood] : the mood was normal [Vibration Dec.] : normal vibratory sensation at the level of the toes [Position Sense Dec.] : normal position sense at the level of the toes [Diminished Throughout Both Feet] : normal tactile sensation with monofilament testing throughout both feet

## 2021-09-06 NOTE — HISTORY OF PRESENT ILLNESS
[FreeTextEntry1] : 66 yr M with DM2 uncontrolled with no known complications here for follow up. \par A1C 8.8 today\par DM2 was diagnosed in 1995\par Has been on insulin for many years\par Current regimen is metformin 1 g BID, Lantus 50 units daily at night, and Novolog 16 Units (if glucose is 200 and below), 18 units (if glucose 250) and 20 Units (if glucose is greater than 250)\par A1C has been in 8-10 range as per patient over the past year.\par No retinopathy. Saw optho 7 months ago\par No neuropathy.\par Had stress test which was nl\par \par History of DM2 in father.\par No hospitalizations for DM. \par Patient uses Free Style Rachel to monitor FS\par Range is mid to high 200s\par \par Has rare hypoglycemia.\par \par Diet:\par Breakfast: Rye toast, 2 eggs and sausage\par Lunch: salad, soup\par Dinner: chicken, fish, veggies, baked potato\par Snacks: ice-cream, cookies usually late at night (will give Humalog 10 Units coverage before snacks)\par No soda or juice. \par \par Exercise:Has not been exercising.\par  \par \par \par

## 2021-09-06 NOTE — ASSESSMENT
[FreeTextEntry1] : 66 yr old M with Type 2 DM uncontrolled A1C 8.8 and HLD\par 1) DM2\par -Patient was counselled on lifestyle modification including carb consistent diet and incorporation of physical activity\par -Can continue current regimen of metformin and insulin\par -Wll start Trulicity 0.75mg subq weekly\par -UTD with ophtho\par -Foot Exam done was normal \par \par 2) HLD- LDL goal <100\par Recommend continue atorvastatin 20mg daily.\par Will check Lipid panel today\par \par 3) HTN \par Controlled.\par Check Micro/Creat ratio today\par \par \par

## 2021-09-08 LAB — HBA1C MFR BLD HPLC: 8.8

## 2021-09-14 ENCOUNTER — APPOINTMENT (OUTPATIENT)
Dept: CARDIOLOGY | Facility: CLINIC | Age: 66
End: 2021-09-14
Payer: MEDICARE

## 2021-09-14 PROCEDURE — 93306 TTE W/DOPPLER COMPLETE: CPT

## 2021-09-14 PROCEDURE — 93880 EXTRACRANIAL BILAT STUDY: CPT

## 2021-09-17 ENCOUNTER — NON-APPOINTMENT (OUTPATIENT)
Age: 66
End: 2021-09-17

## 2021-09-20 ENCOUNTER — APPOINTMENT (OUTPATIENT)
Dept: CARDIOLOGY | Facility: CLINIC | Age: 66
End: 2021-09-20
Payer: MEDICARE

## 2021-09-20 PROCEDURE — 93015 CV STRESS TEST SUPVJ I&R: CPT

## 2021-09-20 PROCEDURE — 78452 HT MUSCLE IMAGE SPECT MULT: CPT

## 2021-09-20 PROCEDURE — A9500: CPT

## 2021-10-25 ENCOUNTER — TRANSCRIPTION ENCOUNTER (OUTPATIENT)
Age: 66
End: 2021-10-25

## 2021-10-29 ENCOUNTER — TRANSCRIPTION ENCOUNTER (OUTPATIENT)
Age: 66
End: 2021-10-29

## 2021-11-03 ENCOUNTER — TRANSCRIPTION ENCOUNTER (OUTPATIENT)
Age: 66
End: 2021-11-03

## 2021-11-08 ENCOUNTER — APPOINTMENT (OUTPATIENT)
Dept: PULMONOLOGY | Facility: CLINIC | Age: 66
End: 2021-11-08

## 2021-11-09 ENCOUNTER — TRANSCRIPTION ENCOUNTER (OUTPATIENT)
Age: 66
End: 2021-11-09

## 2021-12-16 ENCOUNTER — APPOINTMENT (OUTPATIENT)
Dept: ENDOCRINOLOGY | Facility: CLINIC | Age: 66
End: 2021-12-16

## 2021-12-16 ENCOUNTER — TRANSCRIPTION ENCOUNTER (OUTPATIENT)
Age: 66
End: 2021-12-16

## 2022-01-20 ENCOUNTER — TRANSCRIPTION ENCOUNTER (OUTPATIENT)
Age: 67
End: 2022-01-20

## 2022-02-23 ENCOUNTER — TRANSCRIPTION ENCOUNTER (OUTPATIENT)
Age: 67
End: 2022-02-23

## 2022-03-04 ENCOUNTER — APPOINTMENT (OUTPATIENT)
Dept: CARDIOLOGY | Facility: CLINIC | Age: 67
End: 2022-03-04

## 2022-03-16 ENCOUNTER — TRANSCRIPTION ENCOUNTER (OUTPATIENT)
Age: 67
End: 2022-03-16

## 2022-03-25 ENCOUNTER — APPOINTMENT (OUTPATIENT)
Dept: ENDOCRINOLOGY | Facility: CLINIC | Age: 67
End: 2022-03-25

## 2022-03-29 ENCOUNTER — TRANSCRIPTION ENCOUNTER (OUTPATIENT)
Age: 67
End: 2022-03-29

## 2022-05-24 ENCOUNTER — TRANSCRIPTION ENCOUNTER (OUTPATIENT)
Age: 67
End: 2022-05-24

## 2022-06-16 ENCOUNTER — APPOINTMENT (OUTPATIENT)
Dept: ORTHOPEDIC SURGERY | Facility: CLINIC | Age: 67
End: 2022-06-16
Payer: MEDICARE

## 2022-06-16 VITALS — WEIGHT: 180 LBS | HEIGHT: 65 IN | BODY MASS INDEX: 29.99 KG/M2

## 2022-06-16 DIAGNOSIS — M47.812 SPONDYLOSIS W/OUT MYELOPATHY OR RADICULOPATHY, CERVICAL REGION: ICD-10-CM

## 2022-06-16 DIAGNOSIS — S46.812A STRAIN OF OTHER MUSCLES, FASCIA AND TENDONS AT SHOULDER AND UPPER ARM LEVEL, LEFT ARM, INITIAL ENCOUNTER: ICD-10-CM

## 2022-06-16 PROCEDURE — 99203 OFFICE O/P NEW LOW 30 MIN: CPT

## 2022-06-16 PROCEDURE — 72040 X-RAY EXAM NECK SPINE 2-3 VW: CPT

## 2022-06-16 RX ORDER — IBUPROFEN 800 MG/1
800 TABLET, FILM COATED ORAL 3 TIMES DAILY
Qty: 60 | Refills: 0 | Status: ACTIVE | COMMUNITY
Start: 2022-06-16 | End: 1900-01-01

## 2022-06-16 NOTE — ASSESSMENT
[FreeTextEntry1] : Xrays reviewed with patient\par Treatment options discussed\par ibuprofen 800 sent for pain and inflammation\par Recommend course of PT/HEP\par Follow up in 4 weeks with spine specialist

## 2022-06-16 NOTE — HISTORY OF PRESENT ILLNESS
[Neck] : neck [4] : 4 [Dull/Aching] : dull/aching [Localized] : localized [Radiating] : radiating [Intermittent] : intermittent [Full time] : Work status: full time [de-identified] : 6/16/22: Patient is a 66 yo male c/o neck pain for 1 month with no specific injury. No n/t. Not taking any medication for pain. No previous injuries or surgeries to neck.  [FreeTextEntry5] : patient is here with neck pain since 1 month ago\par  [] : Post Surgical Visit: no

## 2022-06-22 ENCOUNTER — TRANSCRIPTION ENCOUNTER (OUTPATIENT)
Age: 67
End: 2022-06-22

## 2022-06-22 ENCOUNTER — APPOINTMENT (OUTPATIENT)
Dept: INTERNAL MEDICINE | Facility: CLINIC | Age: 67
End: 2022-06-22

## 2022-07-05 RX ORDER — INSULIN ASPART 100 [IU]/ML
100 INJECTION, SOLUTION INTRAVENOUS; SUBCUTANEOUS
Qty: 40 | Refills: 3 | Status: COMPLETED | COMMUNITY
Start: 2020-01-30 | End: 2022-07-05

## 2022-07-06 ENCOUNTER — RX RENEWAL (OUTPATIENT)
Age: 67
End: 2022-07-06

## 2022-07-11 ENCOUNTER — TRANSCRIPTION ENCOUNTER (OUTPATIENT)
Age: 67
End: 2022-07-11

## 2022-07-11 ENCOUNTER — RX RENEWAL (OUTPATIENT)
Age: 67
End: 2022-07-11

## 2022-07-11 RX ORDER — INSULIN ASPART 100 [IU]/ML
100 INJECTION, SOLUTION INTRAVENOUS; SUBCUTANEOUS
Qty: 3 | Refills: 3 | Status: COMPLETED | COMMUNITY
Start: 2022-07-05 | End: 2022-07-11

## 2022-07-13 ENCOUNTER — TRANSCRIPTION ENCOUNTER (OUTPATIENT)
Age: 67
End: 2022-07-13

## 2022-07-16 ENCOUNTER — TRANSCRIPTION ENCOUNTER (OUTPATIENT)
Age: 67
End: 2022-07-16

## 2022-07-18 ENCOUNTER — TRANSCRIPTION ENCOUNTER (OUTPATIENT)
Age: 67
End: 2022-07-18

## 2022-07-25 LAB
ALBUMIN SERPL ELPH-MCNC: 4.4 G/DL
ALP BLD-CCNC: 119 U/L
ALT SERPL-CCNC: 25 U/L
ANION GAP SERPL CALC-SCNC: 13 MMOL/L
APPEARANCE: CLEAR
AST SERPL-CCNC: 17 U/L
BACTERIA: NEGATIVE
BASOPHILS # BLD AUTO: 0.05 K/UL
BASOPHILS NFR BLD AUTO: 0.7 %
BILIRUB SERPL-MCNC: 0.3 MG/DL
BILIRUBIN URINE: NEGATIVE
BLOOD URINE: NEGATIVE
BUN SERPL-MCNC: 15 MG/DL
CALCIUM SERPL-MCNC: 10.1 MG/DL
CHLORIDE SERPL-SCNC: 102 MMOL/L
CHOLEST SERPL-MCNC: 227 MG/DL
CO2 SERPL-SCNC: 26 MMOL/L
COLOR: YELLOW
CREAT SERPL-MCNC: 1.06 MG/DL
EGFR: 76 ML/MIN/1.73M2
EOSINOPHIL # BLD AUTO: 0.3 K/UL
EOSINOPHIL NFR BLD AUTO: 4.1 %
ESTIMATED AVERAGE GLUCOSE: 214 MG/DL
GLUCOSE QUALITATIVE U: ABNORMAL
GLUCOSE SERPL-MCNC: 133 MG/DL
HBA1C MFR BLD HPLC: 9.1 %
HCT VFR BLD CALC: 49.1 %
HDLC SERPL-MCNC: 56 MG/DL
HGB BLD-MCNC: 15.7 G/DL
HYALINE CASTS: 0 /LPF
IMM GRANULOCYTES NFR BLD AUTO: 0.8 %
KETONES URINE: NORMAL
LDLC SERPL CALC-MCNC: 131 MG/DL
LEUKOCYTE ESTERASE URINE: NEGATIVE
LYMPHOCYTES # BLD AUTO: 2 K/UL
LYMPHOCYTES NFR BLD AUTO: 27.1 %
MAN DIFF?: NORMAL
MCHC RBC-ENTMCNC: 28.8 PG
MCHC RBC-ENTMCNC: 32 GM/DL
MCV RBC AUTO: 89.9 FL
MICROSCOPIC-UA: NORMAL
MONOCYTES # BLD AUTO: 0.67 K/UL
MONOCYTES NFR BLD AUTO: 9.1 %
NEUTROPHILS # BLD AUTO: 4.29 K/UL
NEUTROPHILS NFR BLD AUTO: 58.2 %
NITRITE URINE: NEGATIVE
NONHDLC SERPL-MCNC: 171 MG/DL
PH URINE: 7
PLATELET # BLD AUTO: 321 K/UL
POTASSIUM SERPL-SCNC: 4.1 MMOL/L
PROT SERPL-MCNC: 6.6 G/DL
PROTEIN URINE: NORMAL
PSA SERPL-MCNC: 8.39 NG/ML
RBC # BLD: 5.46 M/UL
RBC # FLD: 15.2 %
RED BLOOD CELLS URINE: 3 /HPF
SODIUM SERPL-SCNC: 141 MMOL/L
SPECIFIC GRAVITY URINE: 1.02
SQUAMOUS EPITHELIAL CELLS: 0 /HPF
TRIGL SERPL-MCNC: 198 MG/DL
TSH SERPL-ACNC: 2.77 UIU/ML
UROBILINOGEN URINE: NORMAL
WBC # FLD AUTO: 7.37 K/UL
WHITE BLOOD CELLS URINE: 1 /HPF

## 2022-07-26 ENCOUNTER — NON-APPOINTMENT (OUTPATIENT)
Age: 67
End: 2022-07-26

## 2022-07-26 LAB
PSA FREE FLD-MCNC: 17 %
PSA FREE SERPL-MCNC: 1.42 NG/ML
PSA SERPL-MCNC: 8.17 NG/ML

## 2022-08-04 ENCOUNTER — APPOINTMENT (OUTPATIENT)
Dept: ENDOCRINOLOGY | Facility: CLINIC | Age: 67
End: 2022-08-04

## 2022-08-04 VITALS
TEMPERATURE: 97.9 F | DIASTOLIC BLOOD PRESSURE: 84 MMHG | RESPIRATION RATE: 20 BRPM | SYSTOLIC BLOOD PRESSURE: 132 MMHG | OXYGEN SATURATION: 96 % | HEART RATE: 99 BPM | BODY MASS INDEX: 29.82 KG/M2 | WEIGHT: 179 LBS | HEIGHT: 65 IN

## 2022-08-04 LAB
CREAT SPEC-SCNC: 137 MG/DL
MICROALBUMIN 24H UR DL<=1MG/L-MCNC: 1.2 MG/DL
MICROALBUMIN/CREAT 24H UR-RTO: 9 MG/G

## 2022-08-04 PROCEDURE — 99214 OFFICE O/P EST MOD 30 MIN: CPT

## 2022-08-05 RX ORDER — FLASH GLUCOSE SCANNING READER
EACH MISCELLANEOUS
Qty: 1 | Refills: 0 | Status: ACTIVE | COMMUNITY
Start: 2019-12-13 | End: 1900-01-01

## 2022-08-12 NOTE — ASSESSMENT
[FreeTextEntry1] : 67 yr old M with Type 2 DM uncontrolled A1C 9.1% and HLD\par 1) DM2\par -Patient was counselled on lifestyle modification including carb consistent diet and incorporation of physical activity\par -Can continue current regimen of metformin and insulin\par -Will increase Trulicity 1.5mg subq weekly\par -UTD with ophtho\par -Foot Exam done was normal \par \par 2) HLD- LDL goal <100\par Recommend increase to atorvastatin 40mg daily.\par \par \par 3) HTN \par Controlled.\par Urine Micro/Creat ratio normal Jul 2022\par \par \par

## 2022-08-12 NOTE — HISTORY OF PRESENT ILLNESS
[FreeTextEntry1] : 67 yr M with DM2 uncontrolled with no known complications here for follow up. \par A1C 9.1 7/24/22\par DM2 was diagnosed in 1995\par Has been on insulin for many years\par Current regimen is metformin 1 g BID, Lantus 50 units daily at night, and Novolog 16 Units (if glucose is 200 and below), 18 units (if glucose 250) and 20 Units (if glucose is greater than 250), Trulicity 0.75 mg subq weekly\par A1C has been in 8-10 range as per patient over the past year.\par No retinopathy. Saw optho 1.5 months ago\par No neuropathy.\par Had stress test which was nl\par \par History of DM2 in father.\par No hospitalizations for DM. \par Patient uses Free Style Rachel to monitor FS\par Range is mid to high 200s\par \par Has rare hypoglycemia.\par Has arthritis which limits his physical activity\par \par Diet:\par Breakfast: Rye toast, 2 eggs and sausage\par Lunch: salad, soup\par Dinner: chicken, fish, veggies, baked potato\par Snacks: ice-cream, cookies usually late at night (will give Humalog 10 Units coverage before snacks)\par No soda or juice. \par \par  \par \par \par

## 2022-08-15 ENCOUNTER — RX RENEWAL (OUTPATIENT)
Age: 67
End: 2022-08-15

## 2022-08-23 ENCOUNTER — TRANSCRIPTION ENCOUNTER (OUTPATIENT)
Age: 67
End: 2022-08-23

## 2022-09-15 ENCOUNTER — APPOINTMENT (OUTPATIENT)
Dept: ORTHOPEDIC SURGERY | Facility: CLINIC | Age: 67
End: 2022-09-15

## 2022-09-15 VITALS — HEIGHT: 65 IN | WEIGHT: 179 LBS | BODY MASS INDEX: 29.82 KG/M2

## 2022-09-15 PROCEDURE — 73564 X-RAY EXAM KNEE 4 OR MORE: CPT | Mod: 50

## 2022-09-15 PROCEDURE — 99213 OFFICE O/P EST LOW 20 MIN: CPT

## 2022-09-15 PROCEDURE — 72170 X-RAY EXAM OF PELVIS: CPT

## 2022-09-15 PROCEDURE — 72100 X-RAY EXAM L-S SPINE 2/3 VWS: CPT

## 2022-09-15 NOTE — ASSESSMENT
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

## 2022-09-15 NOTE — IMAGING
[de-identified] : No swelling, no ecchymosis\par Lumbar tenderness to palpation and spasm\par Full range of motion with pain\par 5/5 Motor exam; no focal deficits.\par Positive straight leg raise\par No ankle clonus\par Negative Luly\par Reflexes +2\par Altered sensation to light touch distally\par Stiff back gait pattern\par \par No effusion, no warmth, no ecchymosis, no erythema.\par No tenderness to palpation, no palpable defects.\par Range of motion 0-140 without pain\par 5/5 quadriceps and hamstring strength\par Negative Lachman, negative Suzan, negative anterior drawer, negative posterior drawer, negative patella apprehension; no extensor lag: no varus or valgus instability.\par Motor and sensory intact distally\par Negative Luly\par Non-antalgic gait\par  [Facet arthropathy] : Facet arthropathy [Disc space narrowing] : Disc space narrowing [Scoliosis] : Scoliosis [Bilateral] : knee bilaterally [All Views] : anteroposterior, lateral, skyline, and anteroposterior standing [There are no fractures, subluxations or dislocations. No significant abnormalities are seen] : There are no fractures, subluxations or dislocations. No significant abnormalities are seen

## 2022-09-15 NOTE — HISTORY OF PRESENT ILLNESS
[Gradual] : gradual [7] : 7 [0] : 0 [Dull/Aching] : dull/aching [Tightness] : tightness [Intermittent] : intermittent [Nothing helps with pain getting better] : Nothing helps with pain getting better [Stairs] : stairs [] : no [FreeTextEntry1] : BILATERAL KNEES  [FreeTextEntry5] : PT STATES HES BEEN HAVING PAIN , NO INJURY [de-identified] : MOVEMENT

## 2022-09-16 ENCOUNTER — TRANSCRIPTION ENCOUNTER (OUTPATIENT)
Age: 67
End: 2022-09-16

## 2022-09-18 ENCOUNTER — FORM ENCOUNTER (OUTPATIENT)
Age: 67
End: 2022-09-18

## 2022-09-19 ENCOUNTER — APPOINTMENT (OUTPATIENT)
Dept: MRI IMAGING | Facility: CLINIC | Age: 67
End: 2022-09-19

## 2022-09-19 PROCEDURE — 72148 MRI LUMBAR SPINE W/O DYE: CPT | Mod: MH

## 2022-09-27 LAB
ALBUMIN SERPL ELPH-MCNC: 4.5 G/DL
ALP BLD-CCNC: 121 U/L
ALT SERPL-CCNC: 24 U/L
ANION GAP SERPL CALC-SCNC: 14 MMOL/L
APPEARANCE: CLEAR
AST SERPL-CCNC: 16 U/L
BASOPHILS # BLD AUTO: 0.08 K/UL
BASOPHILS NFR BLD AUTO: 0.8 %
BILIRUB SERPL-MCNC: 0.4 MG/DL
BILIRUBIN URINE: NEGATIVE
BLOOD URINE: NEGATIVE
BUN SERPL-MCNC: 18 MG/DL
CALCIUM SERPL-MCNC: 9.8 MG/DL
CHLORIDE SERPL-SCNC: 100 MMOL/L
CHOLEST SERPL-MCNC: 205 MG/DL
CO2 SERPL-SCNC: 24 MMOL/L
COLOR: NORMAL
CREAT SERPL-MCNC: 1.06 MG/DL
EGFR: 76 ML/MIN/1.73M2
EOSINOPHIL # BLD AUTO: 0.3 K/UL
EOSINOPHIL NFR BLD AUTO: 3.1 %
ESTIMATED AVERAGE GLUCOSE: 186 MG/DL
GLUCOSE QUALITATIVE U: ABNORMAL
GLUCOSE SERPL-MCNC: 184 MG/DL
HBA1C MFR BLD HPLC: 8.1 %
HCT VFR BLD CALC: 48.7 %
HDLC SERPL-MCNC: 60 MG/DL
HGB BLD-MCNC: 15.9 G/DL
IMM GRANULOCYTES NFR BLD AUTO: 0.8 %
KETONES URINE: NORMAL
LDLC SERPL CALC-MCNC: 125 MG/DL
LEUKOCYTE ESTERASE URINE: NEGATIVE
LYMPHOCYTES # BLD AUTO: 1.91 K/UL
LYMPHOCYTES NFR BLD AUTO: 20 %
MAN DIFF?: NORMAL
MCHC RBC-ENTMCNC: 28.6 PG
MCHC RBC-ENTMCNC: 32.6 GM/DL
MCV RBC AUTO: 87.6 FL
MONOCYTES # BLD AUTO: 0.76 K/UL
MONOCYTES NFR BLD AUTO: 7.9 %
NEUTROPHILS # BLD AUTO: 6.44 K/UL
NEUTROPHILS NFR BLD AUTO: 67.4 %
NITRITE URINE: NEGATIVE
NONHDLC SERPL-MCNC: 145 MG/DL
PH URINE: 7
PLATELET # BLD AUTO: 319 K/UL
POTASSIUM SERPL-SCNC: 4.9 MMOL/L
PROT SERPL-MCNC: 6.6 G/DL
PROTEIN URINE: NORMAL
PSA SERPL-MCNC: 4.44 NG/ML
RBC # BLD: 5.56 M/UL
RBC # FLD: 14.5 %
SODIUM SERPL-SCNC: 139 MMOL/L
SPECIFIC GRAVITY URINE: 1.02
TRIGL SERPL-MCNC: 103 MG/DL
TSH SERPL-ACNC: 2.25 UIU/ML
UROBILINOGEN URINE: NORMAL
WBC # FLD AUTO: 9.57 K/UL

## 2022-09-29 ENCOUNTER — APPOINTMENT (OUTPATIENT)
Dept: ORTHOPEDIC SURGERY | Facility: CLINIC | Age: 67
End: 2022-09-29

## 2022-09-29 PROCEDURE — 99213 OFFICE O/P EST LOW 20 MIN: CPT

## 2022-09-29 NOTE — HISTORY OF PRESENT ILLNESS
[Lower back] : lower back [5] : 5 [4] : 4 [Burning] : burning [Shooting] : shooting [Stabbing] : stabbing [Intermittent] : intermittent [Rest] : rest [Exercising] : exercising [de-identified] : 67 year old male with low back pain stiffness.  Here today for follow up of his MRi results.  Patient began with some stiffness and inability to turn left. Went to PT and then developed some pain going down the left arm. Pain in low back and hips began 40 years ago.   Stiffness has gradually worsened.  Pain in low back has been static.  Pain radiates to anterior thighs when walking.  No change in pain when leaning forward. Stopping helps with the pain.   [] : Post Surgical Visit: no [FreeTextEntry1] : l spine [FreeTextEntry5] : pt has a hx of back pain and was initially seen by dr devi and had an xray done, pt was seen in 1982 and was told one leg what shorter than the other  [de-identified] : none

## 2022-09-29 NOTE — IMAGING
[de-identified] : Spine:\par Inspection/Palpation:\par No tenderness to palpation throughout Cervical/thoracic/lumbar spine.\par No bony stepoffs, No lesions.\par \par Gait:\par Non-antalgic, able to perform bilateral toe and heel rise.  Able to perform tandem gait.  \par \par Range of Motion:\par \par Lumbar Spine: Flexion to 90 degrees, Extension to 30 degrees, rotation 30 degrees bilaterally, lateral flexion to 30 degrees bilaterally.\par \par Neurologic:\par \par Bilateral Lower Extremities 5/5 Iliopsoas/Quadriceps/Hamstrings/ Tibialis Anterior/ Gastrocnemius. 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

## 2022-09-29 NOTE — ASSESSMENT
[FreeTextEntry1] : 67 year old man with neurogenic cluadication\par PT\par NSAIDS PRN\par FU in 2 months. \par

## 2022-10-04 ENCOUNTER — RX RENEWAL (OUTPATIENT)
Age: 67
End: 2022-10-04

## 2022-10-07 ENCOUNTER — APPOINTMENT (OUTPATIENT)
Dept: INTERNAL MEDICINE | Facility: CLINIC | Age: 67
End: 2022-10-07

## 2022-10-07 VITALS — SYSTOLIC BLOOD PRESSURE: 124 MMHG | DIASTOLIC BLOOD PRESSURE: 76 MMHG

## 2022-10-07 VITALS
TEMPERATURE: 98 F | WEIGHT: 179 LBS | BODY MASS INDEX: 32.94 KG/M2 | OXYGEN SATURATION: 97 % | RESPIRATION RATE: 18 BRPM | HEART RATE: 83 BPM | HEIGHT: 62 IN

## 2022-10-07 PROCEDURE — 90686 IIV4 VACC NO PRSV 0.5 ML IM: CPT

## 2022-10-07 PROCEDURE — G0439: CPT

## 2022-10-07 PROCEDURE — 99213 OFFICE O/P EST LOW 20 MIN: CPT | Mod: 25

## 2022-10-07 PROCEDURE — G0008: CPT

## 2022-10-07 RX ORDER — ATORVASTATIN CALCIUM 40 MG/1
40 TABLET, FILM COATED ORAL DAILY
Qty: 90 | Refills: 1 | Status: DISCONTINUED | COMMUNITY
Start: 2019-10-24 | End: 2022-10-07

## 2022-10-07 NOTE — HISTORY OF PRESENT ILLNESS
[FreeTextEntry1] : cpx [de-identified] : cpx\par gen fatigue--witnessed snoring\par some increased depressive symps\par meds reviewed

## 2022-10-07 NOTE — ASSESSMENT
[FreeTextEntry1] : labs reveiwed\par psa 6 months\par req resumption of wellbutrin because of recurrent depressive symps\par change lipitor to crestor as ldl 120's

## 2022-11-03 ENCOUNTER — NON-APPOINTMENT (OUTPATIENT)
Age: 67
End: 2022-11-03

## 2022-11-03 ENCOUNTER — TRANSCRIPTION ENCOUNTER (OUTPATIENT)
Age: 67
End: 2022-11-03

## 2022-11-16 ENCOUNTER — APPOINTMENT (OUTPATIENT)
Dept: PULMONOLOGY | Facility: CLINIC | Age: 67
End: 2022-11-16

## 2022-11-16 VITALS — SYSTOLIC BLOOD PRESSURE: 142 MMHG | DIASTOLIC BLOOD PRESSURE: 69 MMHG

## 2022-11-16 DIAGNOSIS — R29.818 OTHER SYMPTOMS AND SIGNS INVOLVING THE NERVOUS SYSTEM: ICD-10-CM

## 2022-11-16 PROCEDURE — 71046 X-RAY EXAM CHEST 2 VIEWS: CPT

## 2022-11-16 PROCEDURE — 99205 OFFICE O/P NEW HI 60 MIN: CPT | Mod: 25

## 2022-11-16 RX ORDER — MELOXICAM 7.5 MG/1
7.5 TABLET ORAL TWICE DAILY
Qty: 60 | Refills: 1 | Status: DISCONTINUED | COMMUNITY
Start: 2020-09-15 | End: 2022-11-16

## 2022-11-16 NOTE — PHYSICAL EXAM
[No Acute Distress] : no acute distress [Low Lying Soft Palate] : low lying soft palate [IV] : Mallampati Class: IV [Normal Appearance] : normal appearance [Supple] : supple [No JVD] : no jvd [Normal Rate/Rhythm] : normal rate/rhythm [Normal S1, S2] : normal s1, s2 [No Murmurs] : no murmurs [No Rubs] : no rubs [No Gallops] : no gallops [No Resp Distress] : no resp distress [No Acc Muscle Use] : no acc muscle use [Normal Palpation] : normal palpation [Normal Rhythm and Effort] : normal rhythm and effort [Clear to Auscultation Bilaterally] : clear to auscultation bilaterally [No Abnormalities] : no abnormalities [Benign] : benign [Not Tender] : not tender [Soft] : soft [No HSM] : no hsm [Normal Bowel Sounds] : normal bowel sounds [Normal Gait] : normal gait [No Clubbing] : no clubbing [No Cyanosis] : no cyanosis [No Edema] : no edema [FROM] : FROM [Normal Color/ Pigmentation] : normal color/ pigmentation [No Focal Deficits] : no focal deficits [Oriented x3] : oriented x3 [Normal Affect] : normal affect

## 2022-11-16 NOTE — PROCEDURE
[FreeTextEntry1] : Chest x-ray PA lateral November 16, 2022\par Cardiac size normal\par Lung fields are clear\par No parenchymal infiltrate pleural effusions dominant pulmonary nodule\par Soft tissue bony structures unremarkable\par Impression clear lungs

## 2022-11-16 NOTE — DISCUSSION/SUMMARY
[FreeTextEntry1] : History as reviewed\par High clinical suspicion for suspected obstructive sleep apnea\par Recommendations\par Formal sleep study\par Discussed treatment protocols\par Focus primarily on risks and benefits of obstructive sleep apnea\par Addressed CPAP protocol treatment\par Compliance data requirements greater than 70% usage with hours greater than 4 goal of an AHI less than 5\par Schedule sleep study\par PFT to  be scheduled\par Office follow-up to review results and make appropriate recommendations regarding treatment\par All questions answered\par

## 2022-11-16 NOTE — REVIEW OF SYSTEMS
[Fatigue] : fatigue [GERD] : gerd [Depression] : depression [Diabetes] : diabetes [Negative] : Neurologic [TextBox_30] : HPI [TextBox_44] :  carotid atherosclerosis, HLD  [TextBox_83] : Nocturia levels sec blood  sugar [TextBox_91] : hx lumbar  radiculopathy  [TextBox_101] : hx Viral warts

## 2022-11-16 NOTE — HISTORY OF PRESENT ILLNESS
[Never] : never [Awakes Unrefreshed] : awakes unrefreshed [Awakes with Dry Mouth] : awakes with dry mouth [Hypersomnolence] : hypersomnolence [Snoring] : snoring [Unintentional Sleep while Active] : unintentional sleep while active [Unintentional Sleep while Inactive] : unintentional sleep while inactive [TextBox_4] : 67-year-old male\par Pulmonary sleep evaluation\par Patient states he was told by his wife that he has very loud snoring\par He has chronic [place] score\par Awakens with dry mouth fatigue\par States it can take him 1 to 2 hours to get his morning going on\par Daytime hypersomnolence sleepiness on\par No history of falling asleep driving the car reported\par States he has no known history of a prior COVID-19 infection\par Denies any known history of asthma pneumonia pulmonary embolic disease interstitial lung disease or diagnosed obstructive sleep apnea on\par He states he received COVID-vaccine 2 dose protocol with 2 boosters\par Reports flu shot up-to-date\par There is some component of nocturia but he states it is related to the management of his blood sugar how many times per night he needs to get restroom to urinate [Awakes with Headache] : does not awaken with headache [TextBox_93] : nocturnal sweats, 2 hours to awaken in am

## 2022-11-30 ENCOUNTER — APPOINTMENT (OUTPATIENT)
Dept: PULMONOLOGY | Facility: CLINIC | Age: 67
End: 2022-11-30

## 2022-11-30 PROCEDURE — ZZZZZ: CPT

## 2022-12-06 ENCOUNTER — APPOINTMENT (OUTPATIENT)
Dept: PULMONOLOGY | Facility: CLINIC | Age: 67
End: 2022-12-06

## 2022-12-06 PROCEDURE — 95800 SLP STDY UNATTENDED: CPT

## 2022-12-07 ENCOUNTER — APPOINTMENT (OUTPATIENT)
Dept: ORTHOPEDIC SURGERY | Facility: CLINIC | Age: 67
End: 2022-12-07

## 2022-12-07 VITALS — HEIGHT: 62 IN | BODY MASS INDEX: 32.94 KG/M2 | WEIGHT: 179 LBS

## 2022-12-07 PROCEDURE — 95800 SLP STDY UNATTENDED: CPT

## 2022-12-07 PROCEDURE — 99213 OFFICE O/P EST LOW 20 MIN: CPT

## 2022-12-07 NOTE — HISTORY OF PRESENT ILLNESS
[Lower back] : lower back [5] : 5 [4] : 4 [Burning] : burning [Shooting] : shooting [Stabbing] : stabbing [Intermittent] : intermittent [Rest] : rest [Exercising] : exercising [de-identified] : 67 year old male with low back pain stiffness.  Here today for follow up of his MRi results.  Patient began with some stiffness and inability to turn left. Went to PT and then developed some pain going down the left arm. Pain in low back and hips began 40 years ago.   Stiffness has gradually worsened.  Pain in low back has been static.  Pain radiates to anterior thighs when walking.  No change in pain when leaning forward. Stopping helps with the pain.  \par \par 12/07/2022: Pt reports that he has been doing well at PT, and has experienced improvement of cervical pain.  His LBP remains stable and varies in intensity, and is worst on the RT side (aggravated by turning over in bed).  Continued radiation of neck pain down LUE, as well as pain in anterior thighs when walking more than 5 minutes. He continues to perform at home exercises.   [] : Post Surgical Visit: no [FreeTextEntry1] : lumbar spine [FreeTextEntry5] : pt has a hx of back pain and was initially seen by dr devi and had an xray done, pt was seen in 1982 and was told one leg what shorter than the other  [de-identified] : none

## 2022-12-07 NOTE — ASSESSMENT
[FreeTextEntry1] : 67 year old man with neurogenic cluadication\par PT\par NSAIDS PRN\par FU in 2 months. \par Pain has been improved with PT.  \par Discussed possibility of injection therapy vs continued PT and patient would like to continue with PT at this time.

## 2022-12-07 NOTE — IMAGING
[de-identified] : Spine:\par Inspection/Palpation:\par No tenderness to palpation throughout Cervical/thoracic/lumbar spine.\par No bony stepoffs, No lesions.\par \par Gait:\par Non-antalgic, able to perform bilateral toe and heel rise.  Able to perform tandem gait.  \par \par Range of Motion:\par \par Lumbar Spine: Flexion to 90 degrees, Extension to 30 degrees, rotation 30 degrees bilaterally, lateral flexion to 30 degrees bilaterally.\par \par Neurologic:\par \par Bilateral Lower Extremities 5/5 Iliopsoas/Quadriceps/Hamstrings/ Tibialis Anterior/ Gastrocnemius. 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

## 2022-12-16 ENCOUNTER — APPOINTMENT (OUTPATIENT)
Dept: PULMONOLOGY | Facility: CLINIC | Age: 67
End: 2022-12-16

## 2022-12-27 ENCOUNTER — APPOINTMENT (OUTPATIENT)
Dept: ENDOCRINOLOGY | Facility: CLINIC | Age: 67
End: 2022-12-27

## 2022-12-27 ENCOUNTER — RESULT CHARGE (OUTPATIENT)
Age: 67
End: 2022-12-27

## 2022-12-27 VITALS
BODY MASS INDEX: 34.23 KG/M2 | WEIGHT: 186 LBS | SYSTOLIC BLOOD PRESSURE: 170 MMHG | HEIGHT: 62 IN | DIASTOLIC BLOOD PRESSURE: 88 MMHG

## 2022-12-27 LAB
GLUCOSE BLDC GLUCOMTR-MCNC: 98
HBA1C MFR BLD HPLC: 6.8

## 2022-12-27 PROCEDURE — 95251 CONT GLUC MNTR ANALYSIS I&R: CPT

## 2022-12-27 PROCEDURE — 99212 OFFICE O/P EST SF 10 MIN: CPT | Mod: 25

## 2022-12-28 ENCOUNTER — TRANSCRIPTION ENCOUNTER (OUTPATIENT)
Age: 67
End: 2022-12-28

## 2022-12-29 ENCOUNTER — TRANSCRIPTION ENCOUNTER (OUTPATIENT)
Age: 67
End: 2022-12-29

## 2022-12-29 ENCOUNTER — APPOINTMENT (OUTPATIENT)
Dept: PULMONOLOGY | Facility: CLINIC | Age: 67
End: 2022-12-29
Payer: MEDICARE

## 2022-12-29 VITALS
SYSTOLIC BLOOD PRESSURE: 162 MMHG | HEART RATE: 101 BPM | DIASTOLIC BLOOD PRESSURE: 91 MMHG | HEIGHT: 62 IN | TEMPERATURE: 97.9 F | WEIGHT: 180 LBS | OXYGEN SATURATION: 97 % | BODY MASS INDEX: 33.13 KG/M2

## 2022-12-29 VITALS — SYSTOLIC BLOOD PRESSURE: 148 MMHG | DIASTOLIC BLOOD PRESSURE: 68 MMHG

## 2022-12-29 DIAGNOSIS — R53.83 OTHER FATIGUE: ICD-10-CM

## 2022-12-29 LAB — POCT - HEMOGLOBIN (HGB), QUANTITATIVE, TRANSCUTANEOUS: 16.4

## 2022-12-29 PROCEDURE — 94010 BREATHING CAPACITY TEST: CPT

## 2022-12-29 PROCEDURE — 88738 HGB QUANT TRANSCUTANEOUS: CPT

## 2022-12-29 PROCEDURE — 94727 GAS DIL/WSHOT DETER LNG VOL: CPT

## 2022-12-29 PROCEDURE — 99214 OFFICE O/P EST MOD 30 MIN: CPT | Mod: 25

## 2022-12-29 PROCEDURE — 94729 DIFFUSING CAPACITY: CPT

## 2022-12-29 NOTE — HISTORY OF PRESENT ILLNESS
[Awakes Unrefreshed] : awakes unrefreshed [Awakes with Dry Mouth] : awakes with dry mouth [Hypersomnolence] : hypersomnolence [Snoring] : snoring [Unintentional Sleep while Active] : unintentional sleep while active [Unintentional Sleep while Inactive] : unintentional sleep while inactive [TextBox_4] : 67-year-old male\par Pulmonary sleep evaluation\par Patient states he was told by his wife that he has very loud snoring\par He has chronic [place] score\par Awakens with dry mouth fatigue\par States it can take him 1 to 2 hours to get his morning going on\par Daytime hypersomnolence sleepiness on\par No history of falling asleep driving the car reported\par States he has no known history of a prior COVID-19 infection\par Denies any known history of asthma pneumonia pulmonary embolic disease interstitial lung disease or diagnosed obstructive sleep apnea on\par He states he received COVID-vaccine 2 dose protocol with 2 boosters\par Reports flu shot up-to-date\par There is some component of nocturia but he states it is related to the management of his blood sugar how many times per night he needs to get restroom to urinate [Awakes with Headache] : does not awaken with headache [TextBox_93] : nocturnal sweats, 2 hours to awaken in am

## 2022-12-29 NOTE — ASSESSMENT
[Diabetes Foot Care] : diabetes foot care [Long Term Vascular Complications] : long term vascular complications of diabetes [Carbohydrate Consistent Diet] : carbohydrate consistent diet [Importance of Diet and Exercise] : importance of diet and exercise to improve glycemic control, achieve weight loss and improve cardiovascular health [Hypoglycemia Management] : hypoglycemia management [Action and use of Insulin] : action and use of short and long-acting insulin [Self Monitoring of Blood Glucose] : self monitoring of blood glucose [Retinopathy Screening] : Patient was referred to ophthalmology for retinopathy screening [Weight Loss] : weight loss [Diabetic Medications] : Risks and benefits of diabetic medications were discussed [FreeTextEntry1] : Type 2 DM\par POC HgbA1c today is 6.8%, at goal, improving\par Reviewed jim sensor tracing, noted TIR is 48%, GMI is noted as 7.4%, 32% high and 12% very high, noted early morning lows 3-6am, noted  6% lows, 2% very lows. \par Decrease Lantus 40U qhs  in view of early morning lows (pt prefers to use Lantus vials, advised to be cautious when drawing up insulin dosing to avoid potential errors)\par Increase Novolog 20U TID ac to cover for postprandial hyperglycemia\par Continue Trulicity 1.5mg weekly\par Continue Metformin 1000mg po daily\par Continue Atorvastatin 40mg daily for HLD\par Noted normal urine Micro/Creat ratio in July 2022. No diabetic foot ulcers. Up to date with ophthalmology annual visit\par \par \par Answered all questions today; patient verbalized understanding of the above\par RTC in 3 months\par

## 2022-12-29 NOTE — DISCUSSION/SUMMARY
[FreeTextEntry1] : History as reviewed\par Severe TIMOTHY AHI 48\par Abnormal PFT with mild restrictive ventilatory impairment\par Rule out secondary to increased abdominal girth\par Recommendations\par Discussed treatment protocols\par Focus primarily on risks and benefits of obstructive sleep apnea\par Addressed CPAP protocol treatment\par Compliance data requirements greater than 70% usage with hours greater than 4 goal of an AHI less than 5\par AUTO CPAP 6-16 cm h20\par Office follow-up to review results and make appropriate recommendations regarding treatment\par All questions answered\par As an addendum\par Patient will be on a cruise between January 8 and January 28\par Therefore request that he cannot deliver a CPAP device until after January 20 with patient notification office follow-up on CPAP and patient requested to bring in CPAP device

## 2022-12-29 NOTE — HISTORY OF PRESENT ILLNESS
[Continuous Glucose Monitoring] : Continuous Glucose Monitoring: Yes [Rachel] : Rachel [Hypoglycemia] : Patient is hypoglycemic. [FreeTextEntry1] : This is a 66 yo male who presents for diabetes follow up\par \par At last endocrine visit in Aug 2022, at which time his HgbA1C was 9.1% and advised to increase Trulicity 1.5mg subq weekly. \par He is up to date with ophthalmology annual visit. Currently he is taking Lantus 40-50U qhs (if glu <90mg/dl he takes a higher dose of Lantus insulin) . He is taking Atorvastatin 40mg daily for HLD\par \par Of note, patient does not want to increase Trulicity dose today. \par He states he goes to PT 2-3x/week, has ortho issues.  [Finger Stick] : Finger Stick: No [FreeTextEntry2] : 48 [FreeTextEntry3] : 32+12 [FreeTextEntry4] : 6+2 [de-identified] : 7.4 [FreeTextEntry5] : 170 [FreeTextEntry6] : 41.4

## 2022-12-29 NOTE — PROCEDURE
[FreeTextEntry1] : Sleep study 12/6/2012 7/2022\par AHI 48\par Severe with positional component obstructive sleep apnea\par Present time less than 90% 4.1% on room air\par Impression severe obstructive sleep apnea positional\par Snoring 43% greater than 30 dL\par \par PFT December 29, 2022\par Mild restrictive ventilatory impairment\par TLC 71% predicted\par Diffusion low normal range 76% predicted\par Hemoglobin 16.4\par \par Chest x-ray PA lateral November 16, 2022\par Cardiac size normal\par Lung fields are clear\par No parenchymal infiltrate pleural effusions dominant pulmonary nodule\par Soft tissue bony structures unremarkable\par Impression clear lungs

## 2023-02-24 ENCOUNTER — APPOINTMENT (OUTPATIENT)
Dept: ORTHOPEDIC SURGERY | Facility: CLINIC | Age: 68
End: 2023-02-24
Payer: MEDICARE

## 2023-02-24 VITALS — HEIGHT: 62 IN | WEIGHT: 180 LBS | BODY MASS INDEX: 33.13 KG/M2

## 2023-02-24 PROCEDURE — 99213 OFFICE O/P EST LOW 20 MIN: CPT

## 2023-02-24 PROCEDURE — 72170 X-RAY EXAM OF PELVIS: CPT

## 2023-02-24 PROCEDURE — 72100 X-RAY EXAM L-S SPINE 2/3 VWS: CPT

## 2023-02-24 RX ORDER — MELOXICAM 15 MG/1
15 TABLET ORAL DAILY
Qty: 30 | Refills: 1 | Status: ACTIVE | COMMUNITY
Start: 2023-02-24 | End: 1900-01-01

## 2023-02-24 RX ORDER — CYCLOBENZAPRINE HYDROCHLORIDE 10 MG/1
10 TABLET, FILM COATED ORAL
Qty: 30 | Refills: 1 | Status: ACTIVE | COMMUNITY
Start: 2023-02-24 | End: 1900-01-01

## 2023-02-24 NOTE — IMAGING
[de-identified] : PE lumbar spine: +tenderness to L paraspinals, +tenderness to L lateral thigh, no midline tenderness, limited motion due to pain, able to SLR with pain, motor and sensory intact, NVI\par  [No bony abnormalities] : No bony abnormalities [Facet arthropathy] : Facet arthropathy [Disc space narrowing] : Disc space narrowing [FreeTextEntry1] : limited due to positioning

## 2023-02-24 NOTE — HISTORY OF PRESENT ILLNESS
[10] : 10 [9] : 9 [de-identified] : 66 y/o M with L back and LE pain x 1 week. pt was in bed for 10 days sick with pain afterwards. he is in PT for l/s spine with relief. denies numbness/tingling. \par  [FreeTextEntry5] : pt c.o pain in lt thigh since sunday 02/19/23, \par

## 2023-03-02 ENCOUNTER — APPOINTMENT (OUTPATIENT)
Dept: PULMONOLOGY | Facility: CLINIC | Age: 68
End: 2023-03-02

## 2023-03-10 ENCOUNTER — APPOINTMENT (OUTPATIENT)
Dept: INTERNAL MEDICINE | Facility: CLINIC | Age: 68
End: 2023-03-10
Payer: MEDICARE

## 2023-03-10 VITALS
HEART RATE: 97 BPM | WEIGHT: 181 LBS | HEIGHT: 62 IN | OXYGEN SATURATION: 100 % | BODY MASS INDEX: 33.31 KG/M2 | RESPIRATION RATE: 18 BRPM

## 2023-03-10 VITALS — SYSTOLIC BLOOD PRESSURE: 122 MMHG | DIASTOLIC BLOOD PRESSURE: 64 MMHG

## 2023-03-10 DIAGNOSIS — Z86.39 PERSONAL HISTORY OF OTHER ENDOCRINE, NUTRITIONAL AND METABOLIC DISEASE: ICD-10-CM

## 2023-03-10 PROCEDURE — 99214 OFFICE O/P EST MOD 30 MIN: CPT

## 2023-03-10 NOTE — HISTORY OF PRESENT ILLNESS
Discharge Summary    Name: Landon Hamm  :  1983   MRN:  [id number]    Primary Care Doctor:  No primary care provider on file. Admit date:  2019    Discharge date:  4/15/2019    Admitting Physician: Misty Mantilla MD   Discharge Physician: Misty Mantilla MD    Reason for admission:  Below copied from H&P and no change required. \" The patient is a 38 y.o. female with history of hypertension who presented to ED with intermittent bilateral feet tingling and acute Bilateral blurry vision. The patient has had intermittent  Bilateral feet  Tingling  That radiates up to her legs for the past 8 months. She has been seen by her  Primary care provider, being considered for MS that her neurological workup has been incomplete, has not seen a neurologist nor got MRI. Gabriele Anthony She developed  Bilateral blurry vision in the past several days which has been continuous. She has mild headache  Diffusely  In her head with some mild nausea without vomiting. She has no known history of migraine. She does have relatives who was diagnosed as MS according to the patient. She currently denies any weakness of extremities  And no problem with speech  At this time. \"    Diagnosis / Hospital Course: The medical problems addressed during this hospitalization include following. #. Bilateral blurry vision, transient, unclear etiology  #. Subjective bilateral feet paresthesia  #. Suspected migraine. #. Cervical spinal cord borderline compression at C4-5 and C5-6 on MRI    She underwent MRI of brain and C-spine. MRI brain was normal.  C-spine MRI showed she has cervical spine cord compression at C4-5 and C5-6 level. At this time, she does not have apparent neurological abnormality on exam, and her muscle strength was intact. [facility] neurosurgery was contacted but did not recommend inpatient transfer. She was able to ambulate. Her symptoms were stable, improved vision symptoms.   She will be followed by [FreeTextEntry1] : urinary symps neurosurgery as outpatient. Neurology was consulted in hospital.  She was given decadron for potential mild cord compression but she refused depacon for poss migraine. Physical Examination upon discharge:   Pt was personally examined by me on the day of discharge with the following findings:  /85   Pulse 64   Temp 96.9 °F (36.1 °C) (Oral)   Resp 16   Ht 5' (1.524 m)   Wt 206 lb (93.4 kg)   SpO2 100%   BMI 40.23 kg/m²   General: The patient appears as stated age. Well appearing, and in no distress. Mental status: Alert, Oriented x3. Coherent. No agitation. Eyes: JOSE. Normal conjunctiva. ENT/Mouth: normal appearing jaw and neck, no neck nodes or sinus tenderness. Clear oropharynx with moist mucous membrane. Cardiovascular:  normal rate, regular rhythm, normal S1, S2, no murmurs, rubs, clicks or gallops. No peripheral edema. Dorsal pedis pulses 2+ bilaterally. Respiratory: clear to auscultation, no wheezes, rales or rhonchi, symmetric air entry. Gastrointestinal: soft, nontender, nondistended, no masses or organomegaly. Genitourinary:  No CVA tenderness. Musculoskletal:  no clubbing or cyanosis. No joint swelling, warmth, or tenderness. Skin:  normal coloration and turgor, no rashes, no suspicious skin lesions noted. Neurology:  CN exams were normal.  Muscle strength was 5 out of 5 in all ext. DTR was 1+ throughout. Sensory was intact to light touch diffusely. Pain sensation was intact throuout.       Condition at the time of discharge:  Stable  Disposition: home  Discharge FOLLOW UP  Follow-up With  Details  Why  Contact Naina Rossi DO  Schedule an appointment as soon as possible for a visit    Simon Chin [address] [facility]  Schedule an appointment as soon as possible for a visit in 1 week  PCP follow up  [address] [place] [address]  [phone]       Discharge Medications:       Carmen, [address] Medication Instructions UMF:[id number]    Printed on:04/15/19 6257   Medication Information                      dextran 70-hypromellose (ARTIFICIAL TEARS) 0.1-0.3 % SOLN opthalmic solution  Place 1 drop into both eyes every 4 hours as needed (eye irritation)             methocarbamol (ROBAXIN) 500 MG tablet  Take 1 tablet by mouth 4 times daily As needed for muscle spasm. metoprolol succinate (TOPROL XL) 25 MG extended release tablet  Take 1 tablet by mouth daily             naproxen (NAPROSYN) 500 MG tablet  Take 1 tablet by mouth 2 times daily as needed for Pain                 Consults:  IP CONSULT TO HOSPITALIST  IP CONSULT TO NEUROLOGY    Significant Procedures:  none    Significant Diagnostic Studies:   Lab Results   Component Value Date    WBC 3.0 (L) 04/14/2019    HGB 12.9 04/14/2019    HCT 40.1 04/14/2019    MCV 89.5 04/14/2019     (L) 04/14/2019     Lab Results   Component Value Date     04/13/2019    K 3.8 04/13/2019     04/13/2019    CO2 22 04/13/2019    BUN 11 04/13/2019    CREATININE 0.8 04/13/2019    GLUCOSE 106 (H) 04/13/2019    CALCIUM 8.9 04/13/2019    PROT 7.7 04/13/2019    LABALBU 4.0 04/13/2019    BILITOT 0.7 04/13/2019    ALKPHOS 99 04/13/2019    AST 41 (H) 04/13/2019    ALT 33 04/13/2019    LABGLOM >60 04/13/2019    GFRAA >60 04/13/2019       Ct Head Wo Contrast    Result Date: 4/13/2019  EXAMINATION: CT OF THE HEAD WITHOUT CONTRAST  4/13/2019 7:28 am TECHNIQUE: CT of the head was performed without the administration of intravenous contrast. Dose modulation, iterative reconstruction, and/or weight based adjustment of the mA/kV was utilized to reduce the radiation dose to as low as reasonably achievable. COMPARISON: March 13, 2019 HISTORY: ORDERING SYSTEM PROVIDED HISTORY: blurred vision/double vision, intermittent parasthesias TECHNOLOGIST PROVIDED HISTORY: CVA Has a \"code stroke\" or \"stroke alert\" been called? ->No [de-identified] : notes wide urine stream (splaying) for several months without other urinary symps\par same meds\par recent dx sleep apnea but intol cpap--\par off wellbutin--didn’t help\par had ct a+p by GI for diarrhea 2 wks ago Ordering Physician Provided Reason for Exam: blurred vision/double vision, intermittent parasthesias Acuity: Acute Type of Exam: Initial Additional signs and symptoms: none Relevant Medical/Surgical History: none FINDINGS: BRAIN/VENTRICLES: There is no acute intracranial hemorrhage, mass effect or midline shift. No abnormal extra-axial fluid collection. The gray-white differentiation is maintained without evidence of an acute infarct. There is no evidence of hydrocephalus. ORBITS: The visualized portion of the orbits demonstrate no acute abnormality. SINUSES: The visualized paranasal sinuses and mastoid air cells demonstrate no acute abnormality. SOFT TISSUES/SKULL:  No acute abnormality of the visualized skull or soft tissues. No acute intracranial abnormality. Mri Cervical Spine W Wo Contrast    Result Date: 4/13/2019  EXAMINATION: MRI OF THE CERVICAL SPINE WITHOUT AND WITH CONTRAST,  4/13/2019 1:07 pm: TECHNIQUE: Multiplanar multisequence MRI of the cervical spine was performed without and with the administration of intravenous contrast. COMPARISON: None HISTORY: ORDERING SYSTEM PROVIDED HISTORY: Demyelinating disease with spinal cord syx TECHNOLOGIST PROVIDED HISTORY: Ordering Physician Provided Reason for Exam: c/o visual disturbance and ha's for past 4 days; with left side weakness; nkt, no sx to either chase per pt. 17 mL ProHance     JG FINDINGS: BONES/ALIGNMENT: Generalized straightening of the cervical spine. Marrow signal is unremarkable. Cervical vertebral bodies appear normal in height. SPINAL CORD: No convincing cervical cord signal abnormality. SOFT TISSUES: No paraspinal mass identified. Incomplete postcontrast sagittal imaging sequence. No evidence of abnormal enhancement on postcontrast axial imaging. C2-C3: There is no significant disc protrusion, spinal canal stenosis or neural foraminal narrowing. C3-C4: Small central disc protrusion. Partial anterior subarachnoid effacement.   Mild administration of intravenous contrast. COMPARISON: MRI brain August 30, 2016 HISTORY: ORDERING SYSTEM PROVIDED HISTORY: ABNORMAL GAIT (ATAXIA) TECHNOLOGIST PROVIDED HISTORY: Ordering Physician Provided Reason for Exam: c/o visual disturbance and ha's for past 4 days; with left side weakness; nkt, no sx to either chase per pt. 17ML Prohance     JG Acuity: Unknown Type of Exam: Unknown FINDINGS: Limited by motion artifact. INTRACRANIAL STRUCTURES/VENTRICLES:  There is no acute infarct. No mass effect or midline shift. No evidence of an acute intracranial hemorrhage. The ventricles and sulci are normal in size and configuration. The sellar/suprasellar regions appear unremarkable. The normal signal voids within the major intracranial vessels appear maintained. There is crowding of the foramen magnum. No abnormal focus of enhancement is seen within the brain. ORBITS: The visualized portion of the orbits demonstrate no acute abnormality. SINUSES: There is scattered minimal mucosal thickening in the paranasal sinuses. The bilateral mastoid air cells are clear. BONES/SOFT TISSUES: The bone marrow signal intensity appears normal. The soft tissues demonstrate no acute abnormality. No acute intracranial abnormality. No mass effect or abnormal intracranial enhancement. Time Spent on discharge is 40 minutes discussing plan of care and discharge medications with patient and nursing staff. Please send a copy of this discharge summary to No primary care provider on file.  and all consultants above    Electronically signed by José Antonio Real MD on 4/15/2019 at 6:26 PM

## 2023-03-10 NOTE — ASSESSMENT
[FreeTextEntry1] : check urine studies\par labs--to lab\par  eval\par advised to f/u with pulm to address untreated sleep apnea

## 2023-03-12 LAB
APPEARANCE: ABNORMAL
BACTERIA UR CULT: NORMAL
BACTERIA: NEGATIVE
BILIRUBIN URINE: NEGATIVE
BLOOD URINE: NEGATIVE
COLOR: ABNORMAL
GLUCOSE QUALITATIVE U: ABNORMAL
HYALINE CASTS: 1 /LPF
KETONES URINE: NEGATIVE
LEUKOCYTE ESTERASE URINE: NEGATIVE
MICROSCOPIC-UA: NORMAL
NITRITE URINE: NEGATIVE
PH URINE: 5.5
PROTEIN URINE: ABNORMAL
RED BLOOD CELLS URINE: 2 /HPF
SPECIFIC GRAVITY URINE: 1.03
SQUAMOUS EPITHELIAL CELLS: 1 /HPF
UROBILINOGEN URINE: NORMAL
WHITE BLOOD CELLS URINE: 2 /HPF

## 2023-03-13 ENCOUNTER — TRANSCRIPTION ENCOUNTER (OUTPATIENT)
Age: 68
End: 2023-03-13

## 2023-03-27 ENCOUNTER — TRANSCRIPTION ENCOUNTER (OUTPATIENT)
Age: 68
End: 2023-03-27

## 2023-03-29 ENCOUNTER — APPOINTMENT (OUTPATIENT)
Dept: ORTHOPEDIC SURGERY | Facility: CLINIC | Age: 68
End: 2023-03-29
Payer: MEDICARE

## 2023-03-29 VITALS — BODY MASS INDEX: 33.31 KG/M2 | WEIGHT: 181 LBS | HEIGHT: 62 IN

## 2023-03-29 PROCEDURE — 99213 OFFICE O/P EST LOW 20 MIN: CPT

## 2023-03-29 NOTE — HISTORY OF PRESENT ILLNESS
[Lower back] : lower back [7] : 7 [Shooting] : shooting [Bending forward] : bending forward [Full time] : Work status: full time [de-identified] : 68yo male presenting with lower back pain. Recently saw Cathleen Smith in UC for shooting pain down the left leg. Pain mostly at night. No N/T/ weakness. Was severe for 2 weeks. Has adjusted sleep position which has helped. Continuing PT 2-3x/wk with relief. tried mobic/ Flexeril with minimal help.  [] : no [de-identified] : xray, MRI [de-identified] :

## 2023-03-29 NOTE — ASSESSMENT
[FreeTextEntry1] : 67 year old man with neurogenic claudication and LLE radiculopathy.  Pain radiating down left leg has improved.  \par PT\par NSAIDS PRN\par FU in 2 months. \par Pain has been improved with PT.  \par Discussed possibility of injection therapy vs continued PT and patient would like to continue with PT at this time.

## 2023-03-29 NOTE — IMAGING
[de-identified] : Spine:\par Inspection/Palpation:\par No tenderness to palpation throughout Cervical/thoracic/lumbar spine.\par No bony stepoffs, No lesions.\par \par Gait:\par Non-antalgic, able to perform bilateral toe and heel rise.  Able to perform tandem gait.  \par \par Range of Motion:\par \par Lumbar Spine: Flexion to 90 degrees, Extension to 30 degrees, rotation 30 degrees bilaterally, lateral flexion to 30 degrees bilaterally.\par \par Neurologic:\par \par Bilateral Lower Extremities 5/5 Iliopsoas/Quadriceps/Hamstrings/ Tibialis Anterior/ Gastrocnemius. 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

## 2023-04-03 ENCOUNTER — RX RENEWAL (OUTPATIENT)
Age: 68
End: 2023-04-03

## 2023-04-04 ENCOUNTER — LABORATORY RESULT (OUTPATIENT)
Age: 68
End: 2023-04-04

## 2023-04-05 LAB
ALBUMIN SERPL ELPH-MCNC: 4.7 G/DL
ALP BLD-CCNC: 133 U/L
ALT SERPL-CCNC: 27 U/L
ANION GAP SERPL CALC-SCNC: 12 MMOL/L
AST SERPL-CCNC: 21 U/L
BILIRUB SERPL-MCNC: 0.3 MG/DL
BUN SERPL-MCNC: 18 MG/DL
CALCIUM SERPL-MCNC: 9.4 MG/DL
CHLORIDE SERPL-SCNC: 104 MMOL/L
CHOLEST SERPL-MCNC: 150 MG/DL
CO2 SERPL-SCNC: 25 MMOL/L
CREAT SERPL-MCNC: 1.08 MG/DL
EGFR: 75 ML/MIN/1.73M2
GLUCOSE SERPL-MCNC: 130 MG/DL
HDLC SERPL-MCNC: 58 MG/DL
LDLC SERPL CALC-MCNC: 72 MG/DL
NONHDLC SERPL-MCNC: 91 MG/DL
POTASSIUM SERPL-SCNC: 4.9 MMOL/L
PROT SERPL-MCNC: 6.7 G/DL
PSA FREE FLD-MCNC: 20 %
PSA FREE SERPL-MCNC: 1.4 NG/ML
PSA SERPL-MCNC: 7.04 NG/ML
PSA SERPL-MCNC: 7.05 NG/ML
SODIUM SERPL-SCNC: 142 MMOL/L
TRIGL SERPL-MCNC: 98 MG/DL

## 2023-04-11 ENCOUNTER — APPOINTMENT (OUTPATIENT)
Dept: ENDOCRINOLOGY | Facility: CLINIC | Age: 68
End: 2023-04-11
Payer: MEDICARE

## 2023-04-11 VITALS
WEIGHT: 186 LBS | SYSTOLIC BLOOD PRESSURE: 134 MMHG | HEART RATE: 119 BPM | HEIGHT: 62 IN | BODY MASS INDEX: 34.23 KG/M2 | DIASTOLIC BLOOD PRESSURE: 71 MMHG | OXYGEN SATURATION: 98 %

## 2023-04-11 PROCEDURE — 95251 CONT GLUC MNTR ANALYSIS I&R: CPT

## 2023-04-11 PROCEDURE — 99213 OFFICE O/P EST LOW 20 MIN: CPT | Mod: 25

## 2023-04-11 PROCEDURE — 83036 HEMOGLOBIN GLYCOSYLATED A1C: CPT | Mod: QW

## 2023-04-11 PROCEDURE — 82962 GLUCOSE BLOOD TEST: CPT

## 2023-04-13 LAB
GLUCOSE BLDC GLUCOMTR-MCNC: 69
HBA1C MFR BLD HPLC: 7.3

## 2023-04-14 NOTE — HISTORY OF PRESENT ILLNESS
[Continuous Glucose Monitoring] : Continuous Glucose Monitoring: Yes [Rachel] : Rachel [Hypoglycemia] : Patient is hypoglycemic. [FreeTextEntry1] : This is a 68 yo male who presents for diabetes follow up\par \par At last endocrine visit in Dec 2022, at which time his HgbA1C was 6.8% and advised to increase Trulicity 1.5mg subq weekly. \par He is up to date with ophthalmology annual visit. Currently he is taking Lantus 40-50U qhs (if glu <90mg/dl he takes a higher dose of Lantus insulin) . He is taking Atorvastatin 40mg daily for HLD\par \par 150 14U, 200-16u, 200-20u\par \par Of note, patient does not want to increase Trulicity dose today. \par He states he goes to PT 2-3x/week, has ortho issues.  [Finger Stick] : Finger Stick: No [FreeTextEntry2] : 41 [FreeTextEntry3] : 37+15 [FreeTextEntry4] : 6+1 [de-identified] : 7.6 [FreeTextEntry5] : 178 [FreeTextEntry6] : 39.3

## 2023-04-14 NOTE — ASSESSMENT
[Diabetes Foot Care] : diabetes foot care [Long Term Vascular Complications] : long term vascular complications of diabetes [Carbohydrate Consistent Diet] : carbohydrate consistent diet [Importance of Diet and Exercise] : importance of diet and exercise to improve glycemic control, achieve weight loss and improve cardiovascular health [Hypoglycemia Management] : hypoglycemia management [Action and use of Insulin] : action and use of short and long-acting insulin [Self Monitoring of Blood Glucose] : self monitoring of blood glucose [Retinopathy Screening] : Patient was referred to ophthalmology for retinopathy screening [Weight Loss] : weight loss [Diabetic Medications] : Risks and benefits of diabetic medications were discussed [FreeTextEntry1] : Type 2 DM\par POC HgbA1c today is 7.3%, at goal, improving\par Reviewed jim sensor tracing, noted TIR is 41%, GMI is noted as 7.6%, 37% high and 15% very high, noted early morning lows 3-6am, noted  6% lows, 1% very lows. \par Decrease Lantus 35-40U qhs  in view of early morning lows (pt prefers to use Lantus vials, advised to be cautious when drawing up insulin dosing to avoid potential errors) \par Adjusted Novolog ISS TID ac to cover for postprandial hyperglycemia\par Continue Trulicity 1.5mg weekly\par Continue Metformin 1000mg po daily\par Continue Atorvastatin 40mg daily for HLD\par Noted normal urine Micro/Creat ratio in July 2022. No diabetic foot ulcers. Up to date with ophthalmology annual visit\par \par \par Answered all questions today; patient verbalized understanding of the above\par RTC in 3 months\par

## 2023-05-24 ENCOUNTER — APPOINTMENT (OUTPATIENT)
Dept: ORTHOPEDIC SURGERY | Facility: CLINIC | Age: 68
End: 2023-05-24

## 2023-07-12 ENCOUNTER — APPOINTMENT (OUTPATIENT)
Dept: ENDOCRINOLOGY | Facility: CLINIC | Age: 68
End: 2023-07-12

## 2023-08-07 ENCOUNTER — RX RENEWAL (OUTPATIENT)
Age: 68
End: 2023-08-07

## 2023-08-16 ENCOUNTER — APPOINTMENT (OUTPATIENT)
Dept: PULMONOLOGY | Facility: CLINIC | Age: 68
End: 2023-08-16
Payer: MEDICARE

## 2023-08-16 VITALS — OXYGEN SATURATION: 95 % | HEART RATE: 107 BPM | DIASTOLIC BLOOD PRESSURE: 79 MMHG | SYSTOLIC BLOOD PRESSURE: 151 MMHG

## 2023-08-16 LAB — POCT - HEMOGLOBIN (HGB), QUANTITATIVE, TRANSCUTANEOUS: 16.4

## 2023-08-16 PROCEDURE — 88738 HGB QUANT TRANSCUTANEOUS: CPT

## 2023-08-16 PROCEDURE — 94727 GAS DIL/WSHOT DETER LNG VOL: CPT

## 2023-08-16 PROCEDURE — ZZZZZ: CPT

## 2023-08-16 PROCEDURE — 94618 PULMONARY STRESS TESTING: CPT

## 2023-08-16 PROCEDURE — 99215 OFFICE O/P EST HI 40 MIN: CPT | Mod: 25

## 2023-08-16 PROCEDURE — 94729 DIFFUSING CAPACITY: CPT

## 2023-08-16 PROCEDURE — 94010 BREATHING CAPACITY TEST: CPT

## 2023-08-16 NOTE — PROCEDURE
[FreeTextEntry1] : Sleep study 12/6/2012 7/2022 AHI 48 Severe with positional component obstructive sleep apnea Present time less than 90% 4.1% on room air Impression severe obstructive sleep apnea positional Snoring 43% greater than 30 dL  Data reviewed PFT August 16, 2023 Indication exertional dyspnea Spirometry normal Lung volumes normal Positive air-trapping RV/TLC pressure 20 to 40% predicted Diffusion normal 99% predicted Hemoglobin 16.4  Pulmonary 6-minute walk exercise study August 16, 2023 Baseline O2 saturation 97% No celine desaturation on room air Normal study   PFT December 29, 2022 Mild restrictive ventilatory impairment TLC 71% predicted Diffusion low normal range 76% predicted Hemoglobin 16.4  Chest x-ray PA lateral November 16, 2022 Cardiac size normal Lung fields are clear No parenchymal infiltrate pleural effusions dominant pulmonary nodule Soft tissue bony structures unremarkable Impression clear lungs

## 2023-08-16 NOTE — DISCUSSION/SUMMARY
[FreeTextEntry1] : History as reviewed No evidence of pulmonary parenchymal disease with desaturation with exercise with normal pulmonary physiology likely etiologies of exertional dyspnea is fatigue tiredness.  Sleep Apnea Which Remains Relatively Untreated-  Follow-up August with CPAP initiation 2-4 weeks  Severe TIMOTHY AHI 48 Abnormal PFT with mild restrictive ventilatory impairment Rule out secondary to increased abdominal girth Recommendations Discussed treatment protocols Focus primarily on risks and benefits of obstructive sleep apnea Addressed CPAP protocol treatment Compliance data requirements greater than 70% usage with hours greater than 4 goal of an AHI less than 5 AUTO CPAP 6-16 cm h20 Office follow-up to review results and make appropriate recommendations regarding treatment All questions answered As an addendum Patient will be on a cruise between January 8 and January 28 Therefore request that he cannot deliver a CPAP device until after January 20 with patient notification office follow-up on CPAP and patient requested to bring in CPAP device

## 2023-08-16 NOTE — HISTORY OF PRESENT ILLNESS
[Awakes Unrefreshed] : awakes unrefreshed [Awakes with Dry Mouth] : awakes with dry mouth [Hypersomnolence] : hypersomnolence [Snoring] : snoring [Unintentional Sleep while Active] : unintentional sleep while active [Unintentional Sleep while Inactive] : unintentional sleep while inactive [TextBox_4] : 67-year-old male states noted did not have f/u after start of CPAP only used  couple nights not  comfortable mask past several months as  per  patient thru wife intermittent with sweats feels run  down now  feels HERNANDEZ with stair climbing  Pulmonary sleep evaluation Patient states he was told by his wife that he has very loud snoring He has chronic [place] score Awakens with dry mouth fatigue States it can take him 1 to 2 hours to get his morning going on Daytime hypersomnolence sleepiness on No history of falling asleep driving the car reported States he has no known history of a prior COVID-19 infection Denies any known history of asthma pneumonia pulmonary embolic disease interstitial lung disease or diagnosed obstructive sleep apnea on He states he received COVID-vaccine 2 dose protocol with 2 boosters Reports flu shot up-to-date There is some component of nocturia but he states it is related to the management of his blood sugar how many times per night he needs to get restroom to urinate [Awakes with Headache] : does not awaken with headache [TextBox_93] : nocturnal sweats, 2 hours to awaken in am

## 2023-08-17 ENCOUNTER — TRANSCRIPTION ENCOUNTER (OUTPATIENT)
Age: 68
End: 2023-08-17

## 2023-08-28 ENCOUNTER — TRANSCRIPTION ENCOUNTER (OUTPATIENT)
Age: 68
End: 2023-08-28

## 2023-09-08 ENCOUNTER — APPOINTMENT (OUTPATIENT)
Dept: PULMONOLOGY | Facility: CLINIC | Age: 68
End: 2023-09-08
Payer: MEDICARE

## 2023-09-08 VITALS
RESPIRATION RATE: 16 BRPM | HEART RATE: 98 BPM | OXYGEN SATURATION: 98 % | WEIGHT: 176 LBS | DIASTOLIC BLOOD PRESSURE: 71 MMHG | SYSTOLIC BLOOD PRESSURE: 137 MMHG | HEIGHT: 62 IN | BODY MASS INDEX: 32.39 KG/M2

## 2023-09-08 PROCEDURE — 94660 CPAP INITIATION&MGMT: CPT

## 2023-09-08 NOTE — ASSESSMENT
[FreeTextEntry1] : Pt seen in drs office for a CPAP I & M. Vitals taken and were stable at this time. Pt using ResMed AirSense 10 PAP machine and it is currently set at 6-16 cm H2O.   Problem- Pt. has not used PAP machine in some time and needed a re-education on machine and mask.  Pt. was also complaining that there was a large leak and mouth was dry from therapy, as well as at some points he did not feel air coming through mask. Resolution- Therapist went how to operate machine w/ pt.  Mask fit test was also performed in office and minimal leak was detected.  Pt. stated that mask seal felt more comfortable as well and could feel the pressure coming through.  Humidity level was increased to decrease mouth dryness as well. Pt was able to return demonstrate usage.  They also were instructed on how to clean all supplies and how to obtain new ones.

## 2023-09-27 ENCOUNTER — APPOINTMENT (OUTPATIENT)
Dept: ORTHOPEDIC SURGERY | Facility: CLINIC | Age: 68
End: 2023-09-27
Payer: MEDICARE

## 2023-09-27 VITALS — BODY MASS INDEX: 32.39 KG/M2 | WEIGHT: 176 LBS | HEIGHT: 62 IN

## 2023-09-27 PROCEDURE — 99213 OFFICE O/P EST LOW 20 MIN: CPT

## 2023-10-11 ENCOUNTER — TRANSCRIPTION ENCOUNTER (OUTPATIENT)
Age: 68
End: 2023-10-11

## 2023-10-18 ENCOUNTER — APPOINTMENT (OUTPATIENT)
Dept: PULMONOLOGY | Facility: CLINIC | Age: 68
End: 2023-10-18
Payer: MEDICARE

## 2023-10-18 VITALS — HEART RATE: 110 BPM | SYSTOLIC BLOOD PRESSURE: 142 MMHG | DIASTOLIC BLOOD PRESSURE: 60 MMHG | RESPIRATION RATE: 16 BRPM

## 2023-10-18 VITALS — OXYGEN SATURATION: 97 % | HEART RATE: 113 BPM

## 2023-10-18 PROCEDURE — 99214 OFFICE O/P EST MOD 30 MIN: CPT

## 2023-10-23 LAB
ALBUMIN SERPL ELPH-MCNC: 4.5 G/DL
ALP BLD-CCNC: 104 U/L
ALT SERPL-CCNC: 22 U/L
ANION GAP SERPL CALC-SCNC: 14 MMOL/L
AST SERPL-CCNC: 20 U/L
BILIRUB SERPL-MCNC: 0.4 MG/DL
BUN SERPL-MCNC: 14 MG/DL
CALCIUM SERPL-MCNC: 9.9 MG/DL
CHLORIDE SERPL-SCNC: 101 MMOL/L
CHOLEST SERPL-MCNC: 179 MG/DL
CO2 SERPL-SCNC: 25 MMOL/L
CREAT SERPL-MCNC: 1.11 MG/DL
CREAT SPEC-SCNC: 101 MG/DL
EGFR: 72 ML/MIN/1.73M2
ESTIMATED AVERAGE GLUCOSE: 166 MG/DL
GLUCOSE SERPL-MCNC: 96 MG/DL
HBA1C MFR BLD HPLC: 7.4 %
HDLC SERPL-MCNC: 65 MG/DL
LDLC SERPL CALC-MCNC: 97 MG/DL
MICROALBUMIN 24H UR DL<=1MG/L-MCNC: 2.3 MG/DL
MICROALBUMIN/CREAT 24H UR-RTO: 23 MG/G
NONHDLC SERPL-MCNC: 114 MG/DL
POTASSIUM SERPL-SCNC: 5 MMOL/L
PROT SERPL-MCNC: 7.3 G/DL
SODIUM SERPL-SCNC: 139 MMOL/L
T4 FREE SERPL-MCNC: 1.3 NG/DL
TRIGL SERPL-MCNC: 95 MG/DL
TSH SERPL-ACNC: 2.51 UIU/ML

## 2023-10-26 ENCOUNTER — APPOINTMENT (OUTPATIENT)
Dept: ENDOCRINOLOGY | Facility: CLINIC | Age: 68
End: 2023-10-26
Payer: MEDICARE

## 2023-10-26 VITALS
DIASTOLIC BLOOD PRESSURE: 85 MMHG | HEART RATE: 109 BPM | BODY MASS INDEX: 33.31 KG/M2 | WEIGHT: 181 LBS | OXYGEN SATURATION: 95 % | HEIGHT: 62 IN | SYSTOLIC BLOOD PRESSURE: 138 MMHG

## 2023-10-26 PROCEDURE — 95251 CONT GLUC MNTR ANALYSIS I&R: CPT

## 2023-10-26 PROCEDURE — G0008: CPT

## 2023-10-26 PROCEDURE — 90686 IIV4 VACC NO PRSV 0.5 ML IM: CPT

## 2023-10-26 PROCEDURE — 99213 OFFICE O/P EST LOW 20 MIN: CPT | Mod: 25

## 2023-10-30 ENCOUNTER — RX CHANGE (OUTPATIENT)
Age: 68
End: 2023-10-30

## 2023-10-30 RX ORDER — TIRZEPATIDE 5 MG/.5ML
5 INJECTION, SOLUTION SUBCUTANEOUS
Qty: 1 | Refills: 1 | Status: DISCONTINUED | COMMUNITY
Start: 2023-10-26 | End: 2023-10-30

## 2023-11-01 ENCOUNTER — TRANSCRIPTION ENCOUNTER (OUTPATIENT)
Age: 68
End: 2023-11-01

## 2023-11-02 ENCOUNTER — RX RENEWAL (OUTPATIENT)
Age: 68
End: 2023-11-02

## 2023-11-09 ENCOUNTER — RX RENEWAL (OUTPATIENT)
Age: 68
End: 2023-11-09

## 2024-01-04 ENCOUNTER — APPOINTMENT (OUTPATIENT)
Dept: ORTHOPEDIC SURGERY | Facility: CLINIC | Age: 69
End: 2024-01-04
Payer: MEDICARE

## 2024-01-04 DIAGNOSIS — M54.16 RADICULOPATHY, LUMBAR REGION: ICD-10-CM

## 2024-01-04 DIAGNOSIS — M51.9 UNSPECIFIED THORACIC, THORACOLUMBAR AND LUMBOSACRAL INTERVERTEBRAL DISC DISORDER: ICD-10-CM

## 2024-01-04 PROCEDURE — 99213 OFFICE O/P EST LOW 20 MIN: CPT

## 2024-01-04 NOTE — ASSESSMENT
[FreeTextEntry1] : 68 M with LBP and BLLE raidc restat PT medical massage FU 2 months if pain persists consider possible pain management referral for injection.  would like to hold off due to DM at this time.

## 2024-01-04 NOTE — IMAGING
[de-identified] : Spine:\par  Inspection/Palpation:\par  No tenderness to palpation throughout Cervical/thoracic/lumbar spine.\par  No bony stepoffs, No lesions.\par  \par  Gait:\par  Non-antalgic, able to perform bilateral toe and heel rise.  Able to perform tandem gait.  \par  \par  Range of Motion:\par  \par  Lumbar Spine: Flexion to 90 degrees, Extension to 30 degrees, rotation 30 degrees bilaterally, lateral flexion to 30 degrees bilaterally.\par  \par  Neurologic:\par  \par  Bilateral Lower Extremities 5/5 Iliopsoas/Quadriceps/Hamstrings/ Tibialis Anterior/ Gastrocnemius. 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

## 2024-01-04 NOTE — HISTORY OF PRESENT ILLNESS
[Lower back] : lower back [Shooting] : shooting [Bending forward] : bending forward [Full time] : Work status: full time [de-identified] : 01/04/2024: pain has worsen since last office visit.  Has not bene able to do PT since lat visit due to running out of benefits.   09/27/2023: here for fu, reports pain has worsened since last office visit. Patient has completed course of PT, exhausted Insurance benefits therefore transitioned to HEP. Would like RX for medical massage. found PT and massage most helpful.   68yo male presenting with lower back pain. Recently saw Cathleen Smith in [facility] for shooting pain down the left leg. Pain mostly at night. No N/T/ weakness. Was severe for 2 weeks. Has adjusted sleep position which has helped. Continuing PT 2-3x/wk with relief. tried mobic/ Flexeril with minimal help.  [] : no [de-identified] : xray, MRI [de-identified] :

## 2024-01-10 ENCOUNTER — APPOINTMENT (OUTPATIENT)
Dept: PULMONOLOGY | Facility: CLINIC | Age: 69
End: 2024-01-10
Payer: MEDICARE

## 2024-01-10 VITALS — SYSTOLIC BLOOD PRESSURE: 127 MMHG | HEART RATE: 91 BPM | DIASTOLIC BLOOD PRESSURE: 73 MMHG | OXYGEN SATURATION: 98 %

## 2024-01-10 PROCEDURE — 99214 OFFICE O/P EST MOD 30 MIN: CPT

## 2024-01-10 NOTE — PHYSICAL EXAM
[No Acute Distress] : no acute distress [Low Lying Soft Palate] : low lying soft palate [IV] : Mallampati Class: IV [Normal Appearance] : normal appearance [Supple] : supple [No JVD] : no jvd [Normal Rate/Rhythm] : normal rate/rhythm [Normal S1, S2] : normal s1, s2 [No Murmurs] : no murmurs [No Gallops] : no gallops [No Rubs] : no rubs [No Resp Distress] : no resp distress [No Acc Muscle Use] : no acc muscle use [Normal Palpation] : normal palpation [Normal Rhythm and Effort] : normal rhythm and effort [Clear to Auscultation Bilaterally] : clear to auscultation bilaterally [No Abnormalities] : no abnormalities [Benign] : benign [Not Tender] : not tender [Soft] : soft [No HSM] : no hsm [Normal Gait] : normal gait [Normal Bowel Sounds] : normal bowel sounds [No Clubbing] : no clubbing [No Cyanosis] : no cyanosis [No Edema] : no edema [FROM] : FROM [Normal Color/ Pigmentation] : normal color/ pigmentation [No Focal Deficits] : no focal deficits [Oriented x3] : oriented x3 [Normal Affect] : normal affect

## 2024-01-10 NOTE — PHYSICAL EXAM
[No Acute Distress] : no acute distress [Low Lying Soft Palate] : low lying soft palate [IV] : Mallampati Class: IV [Normal Appearance] : normal appearance [Supple] : supple [No JVD] : no jvd [Normal Rate/Rhythm] : normal rate/rhythm [Normal S1, S2] : normal s1, s2 [No Murmurs] : no murmurs [No Rubs] : no rubs [No Gallops] : no gallops [No Resp Distress] : no resp distress [No Acc Muscle Use] : no acc muscle use [Normal Palpation] : normal palpation [Normal Rhythm and Effort] : normal rhythm and effort [Clear to Auscultation Bilaterally] : clear to auscultation bilaterally [No Abnormalities] : no abnormalities [Benign] : benign [Not Tender] : not tender [Soft] : soft [No HSM] : no hsm [Normal Gait] : normal gait [Normal Bowel Sounds] : normal bowel sounds [No Clubbing] : no clubbing [No Cyanosis] : no cyanosis [No Edema] : no edema [FROM] : FROM [Normal Color/ Pigmentation] : normal color/ pigmentation [No Focal Deficits] : no focal deficits [Oriented x3] : oriented x3 [Normal Affect] : normal affect

## 2024-01-13 NOTE — HISTORY OF PRESENT ILLNESS
[Awakes Unrefreshed] : awakes unrefreshed [Awakes with Dry Mouth] : awakes with dry mouth [Hypersomnolence] : hypersomnolence [Snoring] : snoring [Unintentional Sleep while Active] : unintentional sleep while active [Unintentional Sleep while Inactive] : unintentional sleep while inactive [TextBox_4] : 67-year-old male dry oral  airway with humdification  not  comfortable mask and was  changedt o nasal  mask  now  feels HERNANDEZ with stair climbing  Pulmonary sleep evaluation Patient states he was told by his wife that he has very loud snoring He has chronic [place] score Awakens with dry mouth fatigue States it can take him 1 to 2 hours to get his morning going on Daytime hypersomnolence sleepiness on No history of falling asleep driving the car reported States he has no known history of a prior COVID-19 infection Denies any known history of asthma pneumonia pulmonary embolic disease interstitial lung disease or diagnosed obstructive sleep apnea on He states he received COVID-vaccine 2 dose protocol with 2 boosters Reports flu shot up-to-date There is some component of nocturia but he states it is related to the management of his blood sugar how many times per night he needs to get restroom to urinate [Awakes with Headache] : does not awaken with headache [TextBox_93] : nocturnal sweats, 2 hours to awaken in am

## 2024-01-13 NOTE — HISTORY OF PRESENT ILLNESS
[Awakes Unrefreshed] : awakes unrefreshed [Awakes with Dry Mouth] : awakes with dry mouth [Hypersomnolence] : hypersomnolence [Snoring] : snoring [Unintentional Sleep while Active] : unintentional sleep while active [Unintentional Sleep while Inactive] : unintentional sleep while inactive [TextBox_4] : 67-year-old male dry oral  airway with humdification  not  comfortable mask and was  changedt o nasal  mask  now  feels HERNANDEZ with stair climbing  Pulmonary sleep evaluation Patient states he was told by his wife that he has very loud snoring He has chronic [place] score Awakens with dry mouth fatigue States it can take him 1 to 2 hours to get his morning going on Daytime hypersomnolence sleepiness on No history of falling asleep driving the car reported States he has no known history of a prior COVID-19 infection Denies any known history of asthma pneumonia pulmonary embolic disease interstitial lung disease or diagnosed obstructive sleep apnea on He states he received COVID-vaccine 2 dose protocol with 2 boosters Reports flu shot up-to-date There is some component of nocturia but he states it is related to the management of his blood sugar how many times per night he needs to get restroom to urinate [Awakes with Headache] : does not awaken with headache [TextBox_93] : nocturnal sweats, 2 hours to awaken in am

## 2024-01-13 NOTE — DISCUSSION/SUMMARY
[FreeTextEntry1] : History as reviewed No evidence of pulmonary parenchymal disease with desaturation with exercise with normal pulmonary physiology likely etiologies of exertional dyspnea is fatigue tiredness.  Sleep Apnea Which Remains Relatively Untreated-  Follow-up August with CPAP initiation 2-4 weeks Gave sample of ResMed 30 i 30full face and also a f/p Maryellen med face mask r/t 2 months for compliance   Severe TIMOTHY AHI 48 Abnormal PFT with mild restrictive ventilatory impairment Rule out secondary to increased abdominal girth Recommendations Discussed treatment protocols Focus primarily on risks and benefits of obstructive sleep apnea Addressed CPAP protocol treatment Compliance data requirements greater than 70% usage with hours greater than 4 goal of an AHI less than 5 AUTO CPAP 6-16 cm h20 Office follow-up to review results and make appropriate recommendations regarding treatment All questions answered gave sample of Maryellen f/p med full face mask adjusted settings to 6-18 cm  will retry and r/t 1 month for f/u

## 2024-01-18 ENCOUNTER — TRANSCRIPTION ENCOUNTER (OUTPATIENT)
Age: 69
End: 2024-01-18

## 2024-01-25 ENCOUNTER — APPOINTMENT (OUTPATIENT)
Dept: ENDOCRINOLOGY | Facility: CLINIC | Age: 69
End: 2024-01-25
Payer: MEDICARE

## 2024-01-25 VITALS
DIASTOLIC BLOOD PRESSURE: 83 MMHG | SYSTOLIC BLOOD PRESSURE: 155 MMHG | HEART RATE: 82 BPM | OXYGEN SATURATION: 97 % | BODY MASS INDEX: 33.31 KG/M2 | HEIGHT: 62 IN | WEIGHT: 181 LBS

## 2024-01-25 PROCEDURE — 99213 OFFICE O/P EST LOW 20 MIN: CPT | Mod: 25

## 2024-01-25 PROCEDURE — 83036 HEMOGLOBIN GLYCOSYLATED A1C: CPT | Mod: QW

## 2024-01-25 PROCEDURE — 95251 CONT GLUC MNTR ANALYSIS I&R: CPT

## 2024-01-26 LAB — HBA1C MFR BLD HPLC: 7.3

## 2024-01-29 ENCOUNTER — TRANSCRIPTION ENCOUNTER (OUTPATIENT)
Age: 69
End: 2024-01-29

## 2024-02-02 NOTE — HISTORY OF PRESENT ILLNESS
[FreeTextEntry1] : This is a 69 yo male who presents for diabetes follow up  At last endocrine visit in Oct 2023, at which time his HgbA1C was 7.3% and advised to  continue Trulicity 1.5mg subq weekly, Lanus 35-40 qhs due to morning losw and using Novolog ISS (he estimates Novolog ISS: if glu  is150 14U, 200-16u, 250-22u, etc) He is up to date with ophthalmology annual visit. He prefers using insulin vials instead of pens. He is taking Atorvastatin 40mg daily for HLD. [Continuous Glucose Monitoring] : Continuous Glucose Monitoring: Yes [Finger Stick] : Finger Stick: No [Hypoglycemia] : Patient is not hypoglycemic. [FreeTextEntry2] : 60 [FreeTextEntry3] : 21+13 [FreeTextEntry4] : 5+1 [de-identified] : 7.3 [FreeTextEntry5] : 165 [FreeTextEntry6] : 39.7

## 2024-02-02 NOTE — ASSESSMENT
[Diabetes Foot Care] : diabetes foot care [Long Term Vascular Complications] : long term vascular complications of diabetes [Carbohydrate Consistent Diet] : carbohydrate consistent diet [Importance of Diet and Exercise] : importance of diet and exercise to improve glycemic control, achieve weight loss and improve cardiovascular health [Hypoglycemia Management] : hypoglycemia management [Action and use of Insulin] : action and use of short and long-acting insulin [Self Monitoring of Blood Glucose] : self monitoring of blood glucose [Injection Technique, Storage, Sharps Disposal] : injection technique, storage, and sharps disposal [Retinopathy Screening] : Patient was referred to ophthalmology for retinopathy screening [Diabetic Medications] : Risks and benefits of diabetic medications were discussed [FreeTextEntry1] : Type 2 DM Recent HgbA1c is 7.3%, at goal, stable Reviewed jim 2 sensor tracing today, noted TIR is 60%, ,21% high and 13% very high, noted early morning lows 3-6am, noted 6% lows, 1% very lows.GMI is noted as 7.3% Decrease Lantus 35-40U qhs in view of early morning lows (pt prefers to use Lantus vials, advised to be cautious when drawing up insulin dosing to avoid potential errors and may also be contributing to variable sugars) Adjusted Novolog ISS TID ac to cover for postprandial hyperglycemia ContinueOzempic 0.5mg weekly Continue Metformin 1000mg po daily Continue Atorvastatin 40mg daily for HLD Noted normal urine Micro/Creat ratio in Oct 2023. No diabetic foot ulcers. Up to date with ophthalmology annual visit Given script for labs prior to next visit   Answered all questions today; patient verbalized understanding of the above RTO in 3 months.

## 2024-02-02 NOTE — PHYSICAL EXAM
[Alert] : alert [No Acute Distress] : no acute distress [Well Developed] : well developed [Normal Sclera/Conjunctiva] : normal sclera/conjunctiva [EOMI] : extra ocular movement intact [No Proptosis] : no proptosis [No Lid Lag] : no lid lag [No LAD] : no lymphadenopathy [Supple] : the neck was supple [Thyroid Not Enlarged] : the thyroid was not enlarged [No Thyroid Nodules] : no palpable thyroid nodules [No Respiratory Distress] : no respiratory distress [No Accessory Muscle Use] : no accessory muscle use [Normal Rate and Effort] : normal respiratory rate and effort [No Stigmata of Cushings Syndrome] : no stigmata of Cushings Syndrome [Normal Gait] : normal gait [No Involuntary Movements] : no involuntary movements were seen [Acanthosis Nigricans] : no acanthosis nigricans [Foot Ulcers] : no foot ulcers [Right foot was examined, including] : right foot ~C was examined, including visual inspection with sensory and pulse exams [Left foot was examined, including] : left foot ~C was examined, including visual inspection with sensory and pulse exams [Diminished Throughout Both Feet] : normal tactile sensation with monofilament testing throughout both feet [No Tremors] : no tremors [Normal Sensation on Monofilament Testing] : normal sensation on monofilament testing of lower extremities [Oriented x3] : oriented to person, place, and time [Normal Insight/Judgement] : insight and judgment were intact

## 2024-02-14 ENCOUNTER — APPOINTMENT (OUTPATIENT)
Dept: PULMONOLOGY | Facility: CLINIC | Age: 69
End: 2024-02-14
Payer: MEDICARE

## 2024-02-14 VITALS
BODY MASS INDEX: 33.13 KG/M2 | SYSTOLIC BLOOD PRESSURE: 130 MMHG | HEART RATE: 95 BPM | HEIGHT: 62 IN | WEIGHT: 180 LBS | OXYGEN SATURATION: 98 % | DIASTOLIC BLOOD PRESSURE: 71 MMHG | RESPIRATION RATE: 16 BRPM

## 2024-02-14 PROCEDURE — 94660 CPAP INITIATION&MGMT: CPT

## 2024-02-14 NOTE — PROCEDURE
[FreeTextEntry1] : Pt seen in drs office for a CPAP I & M. Vitals taken and were stable at this time. Pt using CPAP-(s10 airsense) and it is currently set at (6-18)cm.  Using an airfit f30i ffm med. His wife that he sleeps with has sterns.  Problem-pt stated that he has leaks and still wakes up dry. Resolution- Pt was shown how to adjust the current mask he has however, it has magnets and he can not use that  due to the resmed recall. Suggested the airfit f10 med ffm. He thinks his dme company is [employer]. I advised him to not open the mask they just sent him yesterday but to return it and get the new mask instead. [facility] will send an rx to his dme company .  Once leaking issue is resolved then i advised he try turning up his HH setting. if its maxed out with no  major leaking issue. Pts meds may be drying him out, not his cpap therapy. He stated he understood all instructions.  Pt was shown how to use their machine and supplies and they were able to return demonstrate usage.  They also were instructed on how to clean all supplies and how to obtain new ones.

## 2024-02-19 ENCOUNTER — RX RENEWAL (OUTPATIENT)
Age: 69
End: 2024-02-19

## 2024-03-01 ENCOUNTER — TRANSCRIPTION ENCOUNTER (OUTPATIENT)
Age: 69
End: 2024-03-01

## 2024-03-13 ENCOUNTER — APPOINTMENT (OUTPATIENT)
Dept: PULMONOLOGY | Facility: CLINIC | Age: 69
End: 2024-03-13

## 2024-04-12 ENCOUNTER — APPOINTMENT (OUTPATIENT)
Dept: PULMONOLOGY | Facility: CLINIC | Age: 69
End: 2024-04-12
Payer: MEDICARE

## 2024-04-12 VITALS
RESPIRATION RATE: 16 BRPM | HEART RATE: 92 BPM | OXYGEN SATURATION: 96 % | SYSTOLIC BLOOD PRESSURE: 124 MMHG | DIASTOLIC BLOOD PRESSURE: 71 MMHG

## 2024-04-12 DIAGNOSIS — G47.30 SLEEP APNEA, UNSPECIFIED: ICD-10-CM

## 2024-04-12 PROCEDURE — 94660 CPAP INITIATION&MGMT: CPT

## 2024-04-12 NOTE — ASSESSMENT
[FreeTextEntry1] : Pt seen in drs office for a CPAP I & M. Vitals taken and were stable at this time. Pt using ResMed AirSense 10 PAP machine and it is currently set at 6-18 cm H2O.   Problem- Pt. received new mask (AirFit F10; medium) for DME company and was having difficulties with how to properly wear and secure mask. Resolution- Pt. shown how to don and doff mask and was able to demonstrate teach back.  Therapy was initiated in office w/ new mask w/ minimal to no leak detected. Pt was shown how to use their machine and supplies and they were able to return demonstrate usage.  They also were instructed on how to clean all supplies and how to obtain new ones.

## 2024-04-17 ENCOUNTER — APPOINTMENT (OUTPATIENT)
Dept: ORTHOPEDIC SURGERY | Facility: CLINIC | Age: 69
End: 2024-04-17

## 2024-05-01 ENCOUNTER — APPOINTMENT (OUTPATIENT)
Dept: ORTHOPEDIC SURGERY | Facility: CLINIC | Age: 69
End: 2024-05-01
Payer: MEDICARE

## 2024-05-01 ENCOUNTER — TRANSCRIPTION ENCOUNTER (OUTPATIENT)
Age: 69
End: 2024-05-01

## 2024-05-01 DIAGNOSIS — M54.9 DORSALGIA, UNSPECIFIED: ICD-10-CM

## 2024-05-01 PROCEDURE — 99213 OFFICE O/P EST LOW 20 MIN: CPT

## 2024-05-01 NOTE — IMAGING
[de-identified] : Spine:\par  Inspection/Palpation:\par  No tenderness to palpation throughout Cervical/thoracic/lumbar spine.\par  No bony stepoffs, No lesions.\par  \par  Gait:\par  Non-antalgic, able to perform bilateral toe and heel rise.  Able to perform tandem gait.  \par  \par  Range of Motion:\par  \par  Lumbar Spine: Flexion to 90 degrees, Extension to 30 degrees, rotation 30 degrees bilaterally, lateral flexion to 30 degrees bilaterally.\par  \par  Neurologic:\par  \par  Bilateral Lower Extremities 5/5 Iliopsoas/Quadriceps/Hamstrings/ Tibialis Anterior/ Gastrocnemius. 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

## 2024-05-01 NOTE — ASSESSMENT
[FreeTextEntry1] : 68 M with LBP and BLLE raidc C/w  PT medical massage FU 3 months if pain persists consider possible pain management referral for injection.  would like to hold off due to DM at this time.

## 2024-05-01 NOTE — HISTORY OF PRESENT ILLNESS
[Lower back] : lower back [Shooting] : shooting [Bending forward] : bending forward [Full time] : Work status: full time [de-identified] : 05/01/20254: requesting more meds/PT  01/04/2024: pain has worsen since last office visit.  Has not bene able to do PT since lat visit due to running out of benefits.   09/27/2023: here for fu, reports pain has worsened since last office visit. Patient has completed course of PT, exhausted Insurance benefits therefore transitioned to HEP. Would like RX for medical massage. found PT and massage most helpful.   68yo male presenting with lower back pain. Recently saw Cathleen Smith in  for shooting pain down the left leg. Pain mostly at night. No N/T/ weakness. Was severe for 2 weeks. Has adjusted sleep position which has helped. Continuing PT 2-3x/wk with relief. tried mobic/ Flexeril with minimal help.  [] : no [de-identified] : xray, MRI [de-identified] :

## 2024-05-03 LAB
ALBUMIN SERPL ELPH-MCNC: 4.5 G/DL
ALBUMIN SERPL ELPH-MCNC: 4.7 G/DL
ALP BLD-CCNC: 102 U/L
ALP BLD-CCNC: 108 U/L
ALT SERPL-CCNC: 18 U/L
ALT SERPL-CCNC: 21 U/L
ANION GAP SERPL CALC-SCNC: 14 MMOL/L
ANION GAP SERPL CALC-SCNC: 14 MMOL/L
APPEARANCE: CLEAR
AST SERPL-CCNC: 17 U/L
AST SERPL-CCNC: 19 U/L
BACTERIA: NEGATIVE /HPF
BASOPHILS # BLD AUTO: 0.05 K/UL
BASOPHILS NFR BLD AUTO: 0.6 %
BILIRUB SERPL-MCNC: 0.3 MG/DL
BILIRUB SERPL-MCNC: 0.3 MG/DL
BILIRUBIN URINE: NEGATIVE
BLOOD URINE: NEGATIVE
BUN SERPL-MCNC: 15 MG/DL
BUN SERPL-MCNC: 15 MG/DL
CALCIUM SERPL-MCNC: 9.6 MG/DL
CALCIUM SERPL-MCNC: 9.7 MG/DL
CAST: 0 /LPF
CHLORIDE SERPL-SCNC: 96 MMOL/L
CHLORIDE SERPL-SCNC: 97 MMOL/L
CHOLEST SERPL-MCNC: 182 MG/DL
CHOLEST SERPL-MCNC: 185 MG/DL
CO2 SERPL-SCNC: 23 MMOL/L
CO2 SERPL-SCNC: 24 MMOL/L
COLOR: YELLOW
CREAT SERPL-MCNC: 0.94 MG/DL
CREAT SERPL-MCNC: 1.03 MG/DL
EGFR: 78 ML/MIN/1.73M2
EGFR: 87 ML/MIN/1.73M2
EOSINOPHIL # BLD AUTO: 0.25 K/UL
EOSINOPHIL NFR BLD AUTO: 2.9 %
EPITHELIAL CELLS: 0 /HPF
ESTIMATED AVERAGE GLUCOSE: 189 MG/DL
GLUCOSE QUALITATIVE U: 500 MG/DL
GLUCOSE SERPL-MCNC: 210 MG/DL
GLUCOSE SERPL-MCNC: 218 MG/DL
HBA1C MFR BLD HPLC: 8.2 %
HCT VFR BLD CALC: 46.7 %
HDLC SERPL-MCNC: 68 MG/DL
HDLC SERPL-MCNC: 70 MG/DL
HGB BLD-MCNC: 15.5 G/DL
IMM GRANULOCYTES NFR BLD AUTO: 0.7 %
KETONES URINE: NEGATIVE MG/DL
LDLC SERPL CALC-MCNC: 97 MG/DL
LDLC SERPL CALC-MCNC: 97 MG/DL
LEUKOCYTE ESTERASE URINE: ABNORMAL
LYMPHOCYTES # BLD AUTO: 1.57 K/UL
LYMPHOCYTES NFR BLD AUTO: 18.3 %
MAN DIFF?: NORMAL
MCHC RBC-ENTMCNC: 27.5 PG
MCHC RBC-ENTMCNC: 33.2 GM/DL
MCV RBC AUTO: 82.8 FL
MICROSCOPIC-UA: NORMAL
MONOCYTES # BLD AUTO: 0.78 K/UL
MONOCYTES NFR BLD AUTO: 9.1 %
NEUTROPHILS # BLD AUTO: 5.87 K/UL
NEUTROPHILS NFR BLD AUTO: 68.4 %
NITRITE URINE: NEGATIVE
NONHDLC SERPL-MCNC: 114 MG/DL
NONHDLC SERPL-MCNC: 115 MG/DL
PH URINE: 7.5
PLATELET # BLD AUTO: 281 K/UL
POTASSIUM SERPL-SCNC: 4.9 MMOL/L
POTASSIUM SERPL-SCNC: 5 MMOL/L
PROT SERPL-MCNC: 7 G/DL
PROT SERPL-MCNC: 7.5 G/DL
PROTEIN URINE: NEGATIVE MG/DL
PSA FREE FLD-MCNC: 20 %
PSA FREE SERPL-MCNC: 1.71 NG/ML
PSA FREE SERPL-MCNC: 1.71 NG/ML
PSA SERPL-MCNC: 8.42 NG/ML
RBC # BLD: 5.64 M/UL
RBC # FLD: 15.4 %
RED BLOOD CELLS URINE: 0 /HPF
SODIUM SERPL-SCNC: 134 MMOL/L
SODIUM SERPL-SCNC: 134 MMOL/L
SPECIFIC GRAVITY URINE: 1.01
T4 FREE SERPL-MCNC: 1.5 NG/DL
TRIGL SERPL-MCNC: 92 MG/DL
TRIGL SERPL-MCNC: 99 MG/DL
TSH SERPL-ACNC: 2.79 UIU/ML
UROBILINOGEN URINE: 0.2 MG/DL
WBC # FLD AUTO: 8.58 K/UL
WHITE BLOOD CELLS URINE: 1 /HPF

## 2024-05-07 ENCOUNTER — RX RENEWAL (OUTPATIENT)
Age: 69
End: 2024-05-07

## 2024-05-07 RX ORDER — SEMAGLUTIDE 0.68 MG/ML
2 INJECTION, SOLUTION SUBCUTANEOUS
Qty: 3 | Refills: 1 | Status: ACTIVE | COMMUNITY
Start: 1900-01-01 | End: 1900-01-01

## 2024-05-09 ENCOUNTER — APPOINTMENT (OUTPATIENT)
Dept: ENDOCRINOLOGY | Facility: CLINIC | Age: 69
End: 2024-05-09
Payer: MEDICARE

## 2024-05-09 VITALS
BODY MASS INDEX: 32.39 KG/M2 | HEART RATE: 75 BPM | OXYGEN SATURATION: 95 % | WEIGHT: 176 LBS | SYSTOLIC BLOOD PRESSURE: 144 MMHG | HEIGHT: 62 IN | DIASTOLIC BLOOD PRESSURE: 76 MMHG

## 2024-05-09 PROCEDURE — 95251 CONT GLUC MNTR ANALYSIS I&R: CPT

## 2024-05-09 PROCEDURE — 99214 OFFICE O/P EST MOD 30 MIN: CPT

## 2024-05-09 RX ORDER — ROSUVASTATIN CALCIUM 40 MG/1
40 TABLET, FILM COATED ORAL
Qty: 90 | Refills: 1 | Status: ACTIVE | COMMUNITY
Start: 2022-10-07 | End: 1900-01-01

## 2024-05-09 RX ORDER — INSULIN GLARGINE 100 [IU]/ML
100 INJECTION, SOLUTION SUBCUTANEOUS
Qty: 50 | Refills: 3 | Status: ACTIVE | COMMUNITY
Start: 2020-12-04 | End: 1900-01-01

## 2024-05-09 RX ORDER — INSULIN ASPART 100 [IU]/ML
100 INJECTION, SOLUTION INTRAVENOUS; SUBCUTANEOUS
Qty: 50 | Refills: 1 | Status: ACTIVE | COMMUNITY
Start: 2022-07-11 | End: 1900-01-01

## 2024-05-09 RX ORDER — CALCIUM CARB/VITAMIN D3/VIT K1 500-100-40
31G X 5/16" TABLET,CHEWABLE ORAL
Qty: 4 | Refills: 3 | Status: ACTIVE | COMMUNITY
Start: 2020-01-16 | End: 1900-01-01

## 2024-05-09 RX ORDER — METFORMIN HYDROCHLORIDE 1000 MG/1
1000 TABLET, COATED ORAL
Qty: 180 | Refills: 1 | Status: ACTIVE | COMMUNITY
Start: 2022-08-15 | End: 1900-01-01

## 2024-05-09 NOTE — HISTORY OF PRESENT ILLNESS
[FreeTextEntry1] : This is a 70yo male who presents for diabetes follow up  At last endocrine visit in Jan 2024, at which time his HgbA1C was 7.3% and advised to  continue Ozempic 0.5mg subq weekly, Lantus 35-40 qhs due to morning losw and using Novolog ISS (he estimates Novolog ISS: if glu  is150 14U, 200-16u, 250-22u, etc) He is up to date with ophthalmology annual visit. He prefers using insulin vials instead of pens. He is taking Atorvastatin 40mg daily for HLD. [Rachel] : Rachel [Finger Stick] : Finger Stick: No [Hypoglycemia] : Patient is not hypoglycemic. [FreeTextEntry2] : 59 [FreeTextEntry3] : 27+5 [FreeTextEntry4] : 7+2 [de-identified] : 6.9 [FreeTextEntry5] : 150 [FreeTextEntry6] : 38.8

## 2024-05-09 NOTE — QUALITY MEASURES
[Visual inspection, sensory exam] : Foot exam, including visual inspection, sensory exam with mono filament, and pulse exam, was performed within the last 12 months
Statement Selected

## 2024-05-09 NOTE — PHYSICAL EXAM
[Alert] : alert [No Acute Distress] : no acute distress [Well Developed] : well developed [Normal Sclera/Conjunctiva] : normal sclera/conjunctiva [EOMI] : extra ocular movement intact [No Proptosis] : no proptosis [No Lid Lag] : no lid lag [No LAD] : no lymphadenopathy [Supple] : the neck was supple [Thyroid Not Enlarged] : the thyroid was not enlarged [No Thyroid Nodules] : no palpable thyroid nodules [No Respiratory Distress] : no respiratory distress [No Accessory Muscle Use] : no accessory muscle use [Normal Rate and Effort] : normal respiratory rate and effort [No Stigmata of Cushings Syndrome] : no stigmata of Cushings Syndrome [Normal Gait] : normal gait [No Involuntary Movements] : no involuntary movements were seen [Right foot was examined, including] : right foot ~C was examined, including visual inspection with sensory and pulse exams [Left foot was examined, including] : left foot ~C was examined, including visual inspection with sensory and pulse exams [No Tremors] : no tremors [Normal Sensation on Monofilament Testing] : normal sensation on monofilament testing of lower extremities [Oriented x3] : oriented to person, place, and time [Normal Insight/Judgement] : insight and judgment were intact [Acanthosis Nigricans] : no acanthosis nigricans [Foot Ulcers] : no foot ulcers [Diminished Throughout Both Feet] : normal tactile sensation with monofilament testing throughout both feet

## 2024-05-09 NOTE — ASSESSMENT
[Diabetes Foot Care] : diabetes foot care [Long Term Vascular Complications] : long term vascular complications of diabetes [Carbohydrate Consistent Diet] : carbohydrate consistent diet [Importance of Diet and Exercise] : importance of diet and exercise to improve glycemic control, achieve weight loss and improve cardiovascular health [Hypoglycemia Management] : hypoglycemia management [Action and use of Insulin] : action and use of short and long-acting insulin [Self Monitoring of Blood Glucose] : self monitoring of blood glucose [Injection Technique, Storage, Sharps Disposal] : injection technique, storage, and sharps disposal [Retinopathy Screening] : Patient was referred to ophthalmology for retinopathy screening [Diabetic Medications] : Risks and benefits of diabetic medications were discussed [FreeTextEntry1] : Type 2 DM Reviewed labs from 5/2/24, noted A1c is increased at 8.2% which is not at goal Reviewed jim 2 sensor tracing today, noted TIR is 59%, ,27% high and 5% very high, noted early morning lows 3-6am, noted 7% lows, 2% very lows.GMI is noted as 6.9% in past 14 days  Continue Lantus 35-40U qhs as early morning hypoglycemia resolved (pt prefers to use Lantus vials, advised to be cautious when drawing up insulin dosing to avoid potential errors and may also be contributing to variable sugars) Adjusted Novolog ISS TID ac to cover for postprandial hyperglycemia (10-16U at dinner) Will switch back to Trulicity as he noted better clinical improvement as compared to Ozempic  Continue Metformin 1000mg po daily Continue Rosuvastatin 40mg daily for hyperlipidemia, total cholesterol is normal, LDL slightly above goal 97 mg/dL but will  continue on current dose of statin for now Noted normal urine Micro/Creat ratio in Oct 2023. No diabetic foot ulcers. Up to date with ophthalmology annual visit Given script for labs prior to next visit   Answered all questions today; patient verbalized understanding of the above RTO in 3 months.

## 2024-05-13 ENCOUNTER — NON-APPOINTMENT (OUTPATIENT)
Age: 69
End: 2024-05-13

## 2024-05-13 ENCOUNTER — APPOINTMENT (OUTPATIENT)
Dept: INTERNAL MEDICINE | Facility: CLINIC | Age: 69
End: 2024-05-13
Payer: MEDICARE

## 2024-05-13 ENCOUNTER — TRANSCRIPTION ENCOUNTER (OUTPATIENT)
Age: 69
End: 2024-05-13

## 2024-05-13 VITALS — SYSTOLIC BLOOD PRESSURE: 122 MMHG | DIASTOLIC BLOOD PRESSURE: 74 MMHG

## 2024-05-13 VITALS
BODY MASS INDEX: 29.16 KG/M2 | HEIGHT: 65 IN | OXYGEN SATURATION: 97 % | WEIGHT: 175 LBS | TEMPERATURE: 97.8 F | HEART RATE: 86 BPM

## 2024-05-13 DIAGNOSIS — R53.83 OTHER FATIGUE: ICD-10-CM

## 2024-05-13 DIAGNOSIS — Z00.00 ENCOUNTER FOR GENERAL ADULT MEDICAL EXAMINATION W/OUT ABNORMAL FINDINGS: ICD-10-CM

## 2024-05-13 DIAGNOSIS — E78.5 HYPERLIPIDEMIA, UNSPECIFIED: ICD-10-CM

## 2024-05-13 DIAGNOSIS — G47.33 OBSTRUCTIVE SLEEP APNEA (ADULT) (PEDIATRIC): ICD-10-CM

## 2024-05-13 PROCEDURE — 99213 OFFICE O/P EST LOW 20 MIN: CPT | Mod: 25

## 2024-05-13 PROCEDURE — 93000 ELECTROCARDIOGRAM COMPLETE: CPT

## 2024-05-13 PROCEDURE — G0439: CPT

## 2024-05-13 RX ORDER — BUPROPION HYDROCHLORIDE 300 MG/1
300 TABLET, EXTENDED RELEASE ORAL
Qty: 90 | Refills: 1 | Status: ACTIVE | COMMUNITY
Start: 2022-10-07 | End: 1900-01-01

## 2024-05-13 NOTE — HISTORY OF PRESENT ILLNESS
[FreeTextEntry1] : cpx [de-identified] : cpx general fatigue endo and sleep notes reviewed on/off wellbutrin xl 150--mood is better on it--he req higher dose cardiac workup neg 2021 labs reviewed has some urinary incont and nocturiax2

## 2024-05-13 NOTE — HEALTH RISK ASSESSMENT
[No falls in past year] : Patient reported no falls in the past year [With Significant Other] : lives with significant other [] :  [Fully functional (bathing, dressing, toileting, transferring, walking, feeding)] : Fully functional (bathing, dressing, toileting, transferring, walking, feeding) [Fully functional (using the telephone, shopping, preparing meals, housekeeping, doing laundry, using] : Fully functional and needs no help or supervision to perform IADLs (using the telephone, shopping, preparing meals, housekeeping, doing laundry, using transportation, managing medications and managing finances) [With Patient/Caregiver] : , with patient/caregiver [AdvancecareDate] : 5/24

## 2024-05-15 ENCOUNTER — APPOINTMENT (OUTPATIENT)
Dept: ENDOCRINOLOGY | Facility: CLINIC | Age: 69
End: 2024-05-15

## 2024-05-15 ENCOUNTER — TRANSCRIPTION ENCOUNTER (OUTPATIENT)
Age: 69
End: 2024-05-15

## 2024-05-15 RX ORDER — FLASH GLUCOSE SENSOR
KIT MISCELLANEOUS
Qty: 6 | Refills: 2 | Status: ACTIVE | COMMUNITY
Start: 2019-11-20 | End: 1900-01-01

## 2024-05-16 ENCOUNTER — TRANSCRIPTION ENCOUNTER (OUTPATIENT)
Age: 69
End: 2024-05-16

## 2024-05-16 RX ORDER — DULAGLUTIDE 1.5 MG/.5ML
1.5 INJECTION, SOLUTION SUBCUTANEOUS
Qty: 3 | Refills: 2 | Status: ACTIVE | COMMUNITY
Start: 2021-09-01

## 2024-05-17 ENCOUNTER — APPOINTMENT (OUTPATIENT)
Dept: PULMONOLOGY | Facility: CLINIC | Age: 69
End: 2024-05-17
Payer: MEDICARE

## 2024-05-17 VITALS — OXYGEN SATURATION: 96 % | SYSTOLIC BLOOD PRESSURE: 131 MMHG | DIASTOLIC BLOOD PRESSURE: 80 MMHG | HEART RATE: 91 BPM

## 2024-05-17 DIAGNOSIS — F32.A DEPRESSION, UNSPECIFIED: ICD-10-CM

## 2024-05-17 DIAGNOSIS — G47.33 OBSTRUCTIVE SLEEP APNEA (ADULT) (PEDIATRIC): ICD-10-CM

## 2024-05-17 DIAGNOSIS — J98.4 OTHER DISORDERS OF LUNG: ICD-10-CM

## 2024-05-17 PROCEDURE — 94010 BREATHING CAPACITY TEST: CPT

## 2024-05-17 PROCEDURE — ZZZZZ: CPT

## 2024-05-17 PROCEDURE — 71046 X-RAY EXAM CHEST 2 VIEWS: CPT

## 2024-05-17 PROCEDURE — 99215 OFFICE O/P EST HI 40 MIN: CPT | Mod: 25

## 2024-05-17 PROCEDURE — 94729 DIFFUSING CAPACITY: CPT

## 2024-05-17 PROCEDURE — 94727 GAS DIL/WSHOT DETER LNG VOL: CPT

## 2024-05-17 NOTE — PROCEDURE
[FreeTextEntry1] : Chest x-ray PA lateral May 17, 2024 Normal cardiac size Clear lung fields No parenchymal infiltrate pleural effusion dominant pulmonary nodule No pneumothorax Sadia mediastinum unremarkable soft tissue bony structures unremarkable Overall impression clear lungs  PFT 5/17/24  [place] nl flow  rates Lung volumes nl  TLC 82 % DLCO 91 % HGB 15.5  CPAP data Compliance 5/17/2024 usage 90 %  Hours > 5  AUTO SET 6-18 cm h20 AHI 5.2  Sleep study 12/6/2012 7/2022 AHI 48 Severe with positional component obstructive sleep apnea Present time less than 90% 4.1% on room air Impression severe obstructive sleep apnea positional Snoring 43% greater than 30 dL  Data reviewed PFT August 16, 2023 Indication exertional dyspnea Spirometry normal Lung volumes normal Positive air-trapping RV/TLC pressure 20 to 40% predicted Diffusion normal 99% predicted Hemoglobin 16.4  Pulmonary 6-minute walk exercise study August 16, 2023 Baseline O2 saturation 97% No celine desaturation on room air Normal study   PFT December 29, 2022 Mild restrictive ventilatory impairment TLC 71% predicted Diffusion low normal range 76% predicted Hemoglobin 16.4  Chest x-ray PA lateral November 16, 2022 Cardiac size normal Lung fields are clear No parenchymal infiltrate pleural effusions dominant pulmonary nodule Soft tissue bony structures unremarkable Impression clear lungs

## 2024-05-17 NOTE — DISCUSSION/SUMMARY
[FreeTextEntry1] : History as reviewed No evidence of pulmonary parenchymal disease with desaturation with exercise with normal pulmonary physiology likely etiologies of exertional dyspnea is fatigue tiredness.  Severe TIMOTHY AHI 48  with CPAP Auto Set 6-18 cm AHI 5.2 essentially normalized but still daytime fatige ?? componenet depressioin placed on SSRI with some improvement  set up Over Night Oximetry ? O2 with CPA  and if nl consider Nuvigil as possible added tx  Follow-up August with CPAP initiation 2-4 weeks Gave sample of ResMed 30 i 30full face and also a f/p Maryellen med face mask r/t 2 months for compliance   Severe TIMOTHY AHI 48 Abnormal PFT with mild restrictive ventilatory impairment Rule out secondary to increased abdominal girth Recommendations Discussed treatment protocols Focus primarily on risks and benefits of obstructive sleep apnea Addressed CPAP protocol treatment Compliance data requirements greater than 70% usage with hours greater than 4 goal of an AHI less than 5 AUTO CPAP 6-16 cm h20 Office follow-up to review results and make appropriate recommendations regarding treatment All questions answered gave sample of Maryellen f/p med full face mask adjusted settings to 6-18 cm  will retry and r/t 1 month for f/u

## 2024-05-17 NOTE — HISTORY OF PRESENT ILLNESS
[Awakes Unrefreshed] : awakes unrefreshed [Awakes with Dry Mouth] : awakes with dry mouth [Hypersomnolence] : hypersomnolence [Snoring] : snoring [Unintentional Sleep while Active] : unintentional sleep while active [Unintentional Sleep while Inactive] : unintentional sleep while inactive [TextBox_4] : 67-year-old male dry oral  airway with humdification  not  comfortable mask and was  changedt o nasal  mask  now  feels HERNANDEZ with stair climbing  Pulmonary sleep evaluation Patient states he was told by his wife that he has very loud snoring He has chronic [place] score Awakens with dry mouth fatigue States it can take him 1 to 2 hours to get his morning going on Daytime hypersomnolence sleepiness on No history of falling asleep driving the car reported States he has no known history of a prior COVID-19 infection Denies any known history of asthma pneumonia pulmonary embolic disease interstitial lung disease or diagnosed obstructive sleep apnea on He states he received COVID-vaccine 2 dose protocol with 2 boosters Reports flu shot up-to-date There is some component of nocturia but he states it is related to the management of his blood sugar how many times per night he needs to get restroom to urinate [Awakes with Headache] : does not awaken with headache [TextBox_93] : nocturnal sweats, 2 hours to awaken in am

## 2024-05-17 NOTE — ASSESSMENT
[FreeTextEntry1] : Pt seen in drs office for a CPAP I & M. Vitals taken and were stable at this time. Feb 14 2024 Pt using ResMed AirSense 10 PAP machine and it is currently set at 6-18 cm H2O.   Problem- Pt. received new mask (AirFit F10; medium) for DME company and was having difficulties with how to properly wear and secure mask. Resolution- Pt. shown how to don and doff mask and was able to demonstrate teach back.  Therapy was initiated in office w/ new mask w/ minimal to no leak detected. Pt was shown how to use their machine and supplies and they were able to return demonstrate usage.  They also were instructed on how to clean all supplies and how to obtain new ones.

## 2024-05-20 PROCEDURE — 94762 N-INVAS EAR/PLS OXIMTRY CONT: CPT

## 2024-05-29 ENCOUNTER — APPOINTMENT (OUTPATIENT)
Dept: UROLOGY | Facility: CLINIC | Age: 69
End: 2024-05-29
Payer: MEDICARE

## 2024-05-29 DIAGNOSIS — Z78.9 OTHER SPECIFIED HEALTH STATUS: ICD-10-CM

## 2024-05-29 DIAGNOSIS — R35.0 FREQUENCY OF MICTURITION: ICD-10-CM

## 2024-05-29 DIAGNOSIS — Z80.51 FAMILY HISTORY OF MALIGNANT NEOPLASM OF KIDNEY: ICD-10-CM

## 2024-05-29 PROCEDURE — 99204 OFFICE O/P NEW MOD 45 MIN: CPT

## 2024-05-29 PROCEDURE — G2211 COMPLEX E/M VISIT ADD ON: CPT

## 2024-05-29 RX ORDER — TAMSULOSIN HYDROCHLORIDE 0.4 MG/1
0.4 CAPSULE ORAL
Qty: 90 | Refills: 3 | Status: ACTIVE | COMMUNITY
Start: 2024-05-29 | End: 1900-01-01

## 2024-05-29 NOTE — HISTORY OF PRESENT ILLNESS
[FreeTextEntry1] : 69-year-old male who presents as a new patient for urinary symptoms  #LUTS Has DM - HbA1c 8.2 UA May 2023: 0 RBC per high-power field, Most bothered by nocturia x2-3, daytime frequency x8-10 times. + urgency with small volume incontinence (few drops). He also reports post-void dribbling, spraying of urine, and weak stream. Never tried any medications for prostate Drinks 3 cups of coffee / day (half-caffeine coffee). Has TIMOTHY and wears a CPAP "most nights" AUASS: 32 - 5/5/5/5/5/2/5 QOL 6 PVR: 33 mL  #Elevated PSA PSA 5/2024 - 8.42, free PSA 1.71 PSA 4/2023 - 7.05, free PSA 1.40 (in setting of negative UA) No family of prostate cancer Saw a urologist during pandemic for elevated PSA - was given a medication - no history of biopsy or MRI of prostate  #ED Problems for 25 years

## 2024-05-29 NOTE — PHYSICAL EXAM
[Normal Appearance] : normal appearance [Edema] : no peripheral edema [] : no respiratory distress [Bowel Sounds] : normal bowel sounds [Abdomen Tenderness] : non-tender [Urethral Meatus] : meatus normal [Epididymis] : the epididymides were normal [Testes Tenderness] : no tenderness of the testes [Testes Mass (___cm)] : there were no testicular masses [Prostate Enlargement] : the prostate was not enlarged [Prostate Tenderness] : the prostate was not tender

## 2024-05-29 NOTE — ASSESSMENT
[FreeTextEntry1] : 69 y.o. M with  #Elevated PSA - PSA trend reviewed - has been elevated on multiple occasions, including in setting of negative UA - We discussed the fact that PSA is a nonspecific test for cancer although it is prostate specific.  We have reviewed the fact that elevations can be caused by multiple separate processes with the prostate including prostate cancer, benign prostate enlargement, prostate inflammation or infection, or combination of any of the above.  We reviewed the use of prostate MRI and assessment of PSA elevation.  I explained to the patient that the MRI would be used both to assess an accurate prostate volume for calculation of PSA density and also be used to assess for any suspicious lesions within the prostate that may potentially harbor clinically significant prostate cancer.  I have explained to him that these parameters will be used to help guide recommendations to him about whether or not he should or should not consider undergoing prostate biopsy to further assess the PSA elevation and MRI findings.  He communicates his understanding and is in agreement to undergo the MRI imaging.  I will be in touch with him with that result once available. - prostate MRI - if high risk lesion is present, will plan for transperineal MRI fusion biopsy   #LUTS, BPH - UA - Prostate size to be assessed on MRI - mild enlargement felt on CINDY - Discussed conservative measures to address nocturia, specifically limiting fluids 2 to 3 hours before bedtime, cutting irritating fluids and evening (such as caffeine, soda, carbonated beverages, alcohol), raising feet prior to bedtime, CPAP compliance.  Discussed diabetes control is also related to urinary symptoms -Trial of tamsulosin.  Side effect profile discussed - Uroflow with low VV - difficult to interpret  #ED - Not bothered by this at the moment

## 2024-05-30 ENCOUNTER — TRANSCRIPTION ENCOUNTER (OUTPATIENT)
Age: 69
End: 2024-05-30

## 2024-05-30 DIAGNOSIS — R31.29 OTHER MICROSCOPIC HEMATURIA: ICD-10-CM

## 2024-05-30 LAB
APPEARANCE: ABNORMAL
BACTERIA: NEGATIVE /HPF
BILIRUBIN URINE: NEGATIVE
BLOOD URINE: NEGATIVE
CALCIUM OXALATE CRYSTALS: PRESENT
CAST: 11 /LPF
COARSE GRANULAR CASTS: PRESENT
COLOR: NORMAL
EPITHELIAL CELLS: 8 /HPF
GLUCOSE QUALITATIVE U: 250 MG/DL
HYALINE CASTS: PRESENT
KETONES URINE: ABNORMAL MG/DL
LEUKOCYTE ESTERASE URINE: NEGATIVE
MICROSCOPIC-UA: NORMAL
MUCUS: PRESENT
NITRITE URINE: NEGATIVE
PH URINE: 5
PROTEIN URINE: 30 MG/DL
RED BLOOD CELLS URINE: 10 /HPF
REVIEW: NORMAL
SPECIFIC GRAVITY URINE: >1.03
UROBILINOGEN URINE: 0.2 MG/DL
WHITE BLOOD CELLS URINE: 2 /HPF

## 2024-05-31 LAB — BACTERIA UR CULT: NORMAL

## 2024-06-03 ENCOUNTER — APPOINTMENT (OUTPATIENT)
Dept: MRI IMAGING | Facility: CLINIC | Age: 69
End: 2024-06-03

## 2024-06-03 ENCOUNTER — RESULT REVIEW (OUTPATIENT)
Age: 69
End: 2024-06-03

## 2024-06-03 PROCEDURE — 72197 MRI PELVIS W/O & W/DYE: CPT | Mod: TC

## 2024-06-03 PROCEDURE — A9585: CPT

## 2024-06-03 PROCEDURE — 76498P: CUSTOM | Mod: TC

## 2024-06-06 ENCOUNTER — TRANSCRIPTION ENCOUNTER (OUTPATIENT)
Age: 69
End: 2024-06-06

## 2024-06-09 ENCOUNTER — OUTPATIENT (OUTPATIENT)
Dept: OUTPATIENT SERVICES | Facility: HOSPITAL | Age: 69
LOS: 1 days | End: 2024-06-09
Payer: MEDICARE

## 2024-06-09 ENCOUNTER — APPOINTMENT (OUTPATIENT)
Dept: CT IMAGING | Facility: IMAGING CENTER | Age: 69
End: 2024-06-09
Payer: MEDICARE

## 2024-06-09 DIAGNOSIS — R31.29 OTHER MICROSCOPIC HEMATURIA: ICD-10-CM

## 2024-06-09 PROCEDURE — 74178 CT ABD&PLV WO CNTR FLWD CNTR: CPT | Mod: 26,MH

## 2024-06-09 PROCEDURE — 74178 CT ABD&PLV WO CNTR FLWD CNTR: CPT

## 2024-06-10 DIAGNOSIS — R93.5 ABNORMAL FINDINGS ON DIAGNOSTIC IMAGING OF OTHER ABDOMINAL REGIONS, INCLUDING RETROPERITONEUM: ICD-10-CM

## 2024-06-11 ENCOUNTER — TRANSCRIPTION ENCOUNTER (OUTPATIENT)
Age: 69
End: 2024-06-11

## 2024-06-11 ENCOUNTER — NON-APPOINTMENT (OUTPATIENT)
Age: 69
End: 2024-06-11

## 2024-06-13 ENCOUNTER — TRANSCRIPTION ENCOUNTER (OUTPATIENT)
Age: 69
End: 2024-06-13

## 2024-06-13 ENCOUNTER — OUTPATIENT (OUTPATIENT)
Dept: OUTPATIENT SERVICES | Facility: HOSPITAL | Age: 69
LOS: 1 days | End: 2024-06-13
Payer: MEDICARE

## 2024-06-13 DIAGNOSIS — Z00.8 ENCOUNTER FOR OTHER GENERAL EXAMINATION: ICD-10-CM

## 2024-06-13 PROCEDURE — C8001: CPT

## 2024-06-20 ENCOUNTER — OUTPATIENT (OUTPATIENT)
Dept: OUTPATIENT SERVICES | Facility: HOSPITAL | Age: 69
LOS: 1 days | End: 2024-06-20

## 2024-06-20 ENCOUNTER — APPOINTMENT (OUTPATIENT)
Dept: INTERNAL MEDICINE | Facility: CLINIC | Age: 69
End: 2024-06-20
Payer: MEDICARE

## 2024-06-20 VITALS
RESPIRATION RATE: 16 BRPM | HEART RATE: 97 BPM | WEIGHT: 164.91 LBS | SYSTOLIC BLOOD PRESSURE: 136 MMHG | TEMPERATURE: 97 F | DIASTOLIC BLOOD PRESSURE: 77 MMHG | OXYGEN SATURATION: 100 % | HEIGHT: 64 IN

## 2024-06-20 VITALS
TEMPERATURE: 97.8 F | HEIGHT: 65 IN | BODY MASS INDEX: 27.16 KG/M2 | SYSTOLIC BLOOD PRESSURE: 132 MMHG | DIASTOLIC BLOOD PRESSURE: 80 MMHG | OXYGEN SATURATION: 97 % | WEIGHT: 163 LBS | HEART RATE: 95 BPM

## 2024-06-20 DIAGNOSIS — N40.0 BENIGN PROSTATIC HYPERPLASIA WITHOUT LOWER URINARY TRACT SYMPTOMS: ICD-10-CM

## 2024-06-20 DIAGNOSIS — E11.65 TYPE 2 DIABETES MELLITUS WITH HYPERGLYCEMIA: ICD-10-CM

## 2024-06-20 DIAGNOSIS — G47.33 OBSTRUCTIVE SLEEP APNEA (ADULT) (PEDIATRIC): ICD-10-CM

## 2024-06-20 DIAGNOSIS — Z98.890 OTHER SPECIFIED POSTPROCEDURAL STATES: Chronic | ICD-10-CM

## 2024-06-20 DIAGNOSIS — Z79.4 TYPE 2 DIABETES MELLITUS WITH HYPERGLYCEMIA: ICD-10-CM

## 2024-06-20 DIAGNOSIS — I10 ESSENTIAL (PRIMARY) HYPERTENSION: ICD-10-CM

## 2024-06-20 DIAGNOSIS — Z90.49 ACQUIRED ABSENCE OF OTHER SPECIFIED PARTS OF DIGESTIVE TRACT: Chronic | ICD-10-CM

## 2024-06-20 DIAGNOSIS — E78.5 HYPERLIPIDEMIA, UNSPECIFIED: ICD-10-CM

## 2024-06-20 DIAGNOSIS — Z01.818 ENCOUNTER FOR OTHER PREPROCEDURAL EXAMINATION: ICD-10-CM

## 2024-06-20 DIAGNOSIS — R97.20 ELEVATED PROSTATE, SPECIFIC ANTIGEN [PSA]: ICD-10-CM

## 2024-06-20 DIAGNOSIS — R93.5 ABNORMAL FINDINGS ON DIAGNOSTIC IMAGING OF OTHER ABDOMINAL REGIONS, INCLUDING RETROPERITONEUM: ICD-10-CM

## 2024-06-20 PROCEDURE — G2211 COMPLEX E/M VISIT ADD ON: CPT

## 2024-06-20 PROCEDURE — 99214 OFFICE O/P EST MOD 30 MIN: CPT

## 2024-06-20 NOTE — ASSESSMENT
[Patient Optimized for Surgery] : Patient optimized for surgery [As per surgery] : as per surgery [FreeTextEntry7] : pt to manage periop diabetes meds as per endo

## 2024-06-20 NOTE — HISTORY OF PRESENT ILLNESS
[No Pertinent Cardiac History] : no history of aortic stenosis, atrial fibrillation, coronary artery disease, recent myocardial infarction, or implantable device/pacemaker [Sleep Apnea] : sleep apnea [Diabetes] : diabetes [(Patient denies any chest pain, claudication, dyspnea on exertion, orthopnea, palpitations or syncope)] : Patient denies any chest pain, claudication, dyspnea on exertion, orthopnea, palpitations or syncope [Moderate (4-6 METs)] : Moderate (4-6 METs) [Chronic Anticoagulation] : no chronic anticoagulation [Chronic Kidney Disease] : no chronic kidney disease [FreeTextEntry1] : Prostate BX [FreeTextEntry2] : 6/24/24 [FreeTextEntry3] : Juan Alberto [FreeTextEntry7] : 9/21--ETT, ECHO neg

## 2024-06-21 ENCOUNTER — NON-APPOINTMENT (OUTPATIENT)
Age: 69
End: 2024-06-21

## 2024-06-21 ENCOUNTER — TRANSCRIPTION ENCOUNTER (OUTPATIENT)
Age: 69
End: 2024-06-21

## 2024-06-21 VITALS
WEIGHT: 164.91 LBS | DIASTOLIC BLOOD PRESSURE: 68 MMHG | HEIGHT: 64 IN | HEART RATE: 82 BPM | OXYGEN SATURATION: 100 % | SYSTOLIC BLOOD PRESSURE: 137 MMHG | RESPIRATION RATE: 16 BRPM | TEMPERATURE: 98 F

## 2024-06-23 ENCOUNTER — TRANSCRIPTION ENCOUNTER (OUTPATIENT)
Age: 69
End: 2024-06-23

## 2024-06-24 ENCOUNTER — TRANSCRIPTION ENCOUNTER (OUTPATIENT)
Age: 69
End: 2024-06-24

## 2024-06-24 ENCOUNTER — RESULT REVIEW (OUTPATIENT)
Age: 69
End: 2024-06-24

## 2024-06-24 ENCOUNTER — OUTPATIENT (OUTPATIENT)
Dept: OUTPATIENT SERVICES | Facility: HOSPITAL | Age: 69
LOS: 1 days | Discharge: HOME | End: 2024-06-24
Payer: MEDICARE

## 2024-06-24 ENCOUNTER — APPOINTMENT (OUTPATIENT)
Dept: UROLOGY | Facility: AMBULATORY SURGERY CENTER | Age: 69
End: 2024-06-24

## 2024-06-24 VITALS
OXYGEN SATURATION: 99 % | RESPIRATION RATE: 16 BRPM | HEART RATE: 85 BPM | DIASTOLIC BLOOD PRESSURE: 74 MMHG | SYSTOLIC BLOOD PRESSURE: 127 MMHG | TEMPERATURE: 98 F

## 2024-06-24 DIAGNOSIS — Z98.890 OTHER SPECIFIED POSTPROCEDURAL STATES: Chronic | ICD-10-CM

## 2024-06-24 DIAGNOSIS — R93.5 ABNORMAL FINDINGS ON DIAGNOSTIC IMAGING OF OTHER ABDOMINAL REGIONS, INCLUDING RETROPERITONEUM: ICD-10-CM

## 2024-06-24 DIAGNOSIS — Z90.49 ACQUIRED ABSENCE OF OTHER SPECIFIED PARTS OF DIGESTIVE TRACT: Chronic | ICD-10-CM

## 2024-06-24 PROCEDURE — 76999F: CUSTOM | Mod: 26

## 2024-06-24 PROCEDURE — G0416: CPT | Mod: 26

## 2024-06-24 PROCEDURE — 52000 CYSTOURETHROSCOPY: CPT

## 2024-06-24 PROCEDURE — 55700: CPT | Mod: 26

## 2024-06-24 RX ORDER — INSULIN ASPART 100 [IU]/ML
0 INJECTION, SOLUTION INTRAVENOUS; SUBCUTANEOUS
Refills: 0 | DISCHARGE

## 2024-06-24 RX ORDER — SEMAGLUTIDE 1.34 MG/ML
0 INJECTION, SOLUTION SUBCUTANEOUS
Refills: 0 | DISCHARGE

## 2024-06-24 RX ORDER — OMEPRAZOLE 10 MG/1
1 CAPSULE, DELAYED RELEASE ORAL
Refills: 0 | DISCHARGE

## 2024-06-24 RX ORDER — ACETAMINOPHEN 325 MG
1000 TABLET ORAL EVERY 6 HOURS
Refills: 0 | Status: DISCONTINUED | OUTPATIENT
Start: 2024-06-24 | End: 2024-07-08

## 2024-06-24 RX ORDER — DEXTROSE MONOHYDRATE AND SODIUM CHLORIDE 5; .3 G/100ML; G/100ML
1000 INJECTION, SOLUTION INTRAVENOUS
Refills: 0 | Status: DISCONTINUED | OUTPATIENT
Start: 2024-06-24 | End: 2024-07-08

## 2024-06-24 RX ORDER — ROSUVASTATIN CALCIUM 20 MG/1
1 TABLET ORAL
Refills: 0 | DISCHARGE

## 2024-06-24 RX ORDER — TAMSULOSIN HYDROCHLORIDE 0.4 MG/1
1 CAPSULE ORAL
Refills: 0 | DISCHARGE

## 2024-06-24 RX ORDER — BUPROPION HYDROCHLORIDE 150 MG/1
1 TABLET, EXTENDED RELEASE ORAL
Refills: 0 | DISCHARGE

## 2024-06-24 RX ORDER — METFORMIN HYDROCHLORIDE 850 MG/1
1 TABLET, FILM COATED ORAL
Refills: 0 | DISCHARGE

## 2024-06-24 RX ORDER — ASPIRIN 325 MG/1
1 TABLET, FILM COATED ORAL
Refills: 0 | DISCHARGE

## 2024-06-24 RX ORDER — INSULIN GLARGINE 100 [IU]/ML
0 INJECTION, SOLUTION SUBCUTANEOUS
Refills: 0 | DISCHARGE

## 2024-06-25 ENCOUNTER — APPOINTMENT (OUTPATIENT)
Dept: UROLOGY | Facility: CLINIC | Age: 69
End: 2024-06-25

## 2024-06-27 DIAGNOSIS — N40.1 BENIGN PROSTATIC HYPERPLASIA WITH LOWER URINARY TRACT SYMPMS: ICD-10-CM

## 2024-06-27 DIAGNOSIS — R35.1 BENIGN PROSTATIC HYPERPLASIA WITH LOWER URINARY TRACT SYMPMS: ICD-10-CM

## 2024-06-27 PROBLEM — N40.0 BENIGN PROSTATIC HYPERPLASIA WITHOUT LOWER URINARY TRACT SYMPTOMS: Chronic | Status: ACTIVE | Noted: 2024-06-20

## 2024-06-27 PROBLEM — Z86.59 PERSONAL HISTORY OF OTHER MENTAL AND BEHAVIORAL DISORDERS: Chronic | Status: ACTIVE | Noted: 2024-06-20

## 2024-06-27 PROBLEM — K21.9 GASTRO-ESOPHAGEAL REFLUX DISEASE WITHOUT ESOPHAGITIS: Chronic | Status: ACTIVE | Noted: 2024-06-20

## 2024-06-27 PROBLEM — E11.9 TYPE 2 DIABETES MELLITUS WITHOUT COMPLICATIONS: Chronic | Status: ACTIVE | Noted: 2024-06-20

## 2024-06-27 PROBLEM — E78.5 HYPERLIPIDEMIA, UNSPECIFIED: Chronic | Status: ACTIVE | Noted: 2024-06-20

## 2024-06-27 PROBLEM — G47.33 OBSTRUCTIVE SLEEP APNEA (ADULT) (PEDIATRIC): Chronic | Status: ACTIVE | Noted: 2024-06-20

## 2024-06-27 LAB — SURGICAL PATHOLOGY STUDY: SIGNIFICANT CHANGE UP

## 2024-07-01 ENCOUNTER — APPOINTMENT (OUTPATIENT)
Dept: GASTROENTEROLOGY | Facility: CLINIC | Age: 69
End: 2024-07-01
Payer: MEDICARE

## 2024-07-01 VITALS
HEART RATE: 94 BPM | HEIGHT: 64 IN | SYSTOLIC BLOOD PRESSURE: 118 MMHG | OXYGEN SATURATION: 98 % | WEIGHT: 163 LBS | DIASTOLIC BLOOD PRESSURE: 71 MMHG | BODY MASS INDEX: 27.83 KG/M2

## 2024-07-01 DIAGNOSIS — Z87.19 PERSONAL HISTORY OF OTHER DISEASES OF THE DIGESTIVE SYSTEM: ICD-10-CM

## 2024-07-01 DIAGNOSIS — K58.9 IRRITABLE BOWEL SYNDROME W/OUT DIARRHEA: ICD-10-CM

## 2024-07-01 DIAGNOSIS — K57.32 DIVERTICULITIS OF LARGE INTESTINE W/OUT PERFORATION OR ABSCESS W/OUT BLEEDING: ICD-10-CM

## 2024-07-01 DIAGNOSIS — K58.1 IRRITABLE BOWEL SYNDROME WITH CONSTIPATION: ICD-10-CM

## 2024-07-01 DIAGNOSIS — K57.90 DIVERTICULOSIS OF INTESTINE, PART UNSPECIFIED, W/OUT PERFORATION OR ABSCESS W/OUT BLEEDING: ICD-10-CM

## 2024-07-01 PROCEDURE — 99214 OFFICE O/P EST MOD 30 MIN: CPT

## 2024-07-01 RX ORDER — LINACLOTIDE 145 UG/1
145 CAPSULE, GELATIN COATED ORAL
Qty: 30 | Refills: 3 | Status: ACTIVE | COMMUNITY
Start: 2024-07-01 | End: 1900-01-01

## 2024-07-01 RX ORDER — CEPHALEXIN 500 MG/1
500 CAPSULE ORAL 3 TIMES DAILY
Qty: 30 | Refills: 1 | Status: ACTIVE | COMMUNITY
Start: 2024-07-01 | End: 1900-01-01

## 2024-07-02 ENCOUNTER — TRANSCRIPTION ENCOUNTER (OUTPATIENT)
Age: 69
End: 2024-07-02

## 2024-07-03 ENCOUNTER — TRANSCRIPTION ENCOUNTER (OUTPATIENT)
Age: 69
End: 2024-07-03

## 2024-07-03 ENCOUNTER — APPOINTMENT (OUTPATIENT)
Dept: ENDOCRINOLOGY | Facility: CLINIC | Age: 69
End: 2024-07-03
Payer: MEDICARE

## 2024-07-03 PROCEDURE — ZZZZZ: CPT

## 2024-07-09 ENCOUNTER — APPOINTMENT (OUTPATIENT)
Dept: UROLOGY | Facility: CLINIC | Age: 69
End: 2024-07-09

## 2024-07-22 ENCOUNTER — APPOINTMENT (OUTPATIENT)
Dept: PULMONOLOGY | Facility: CLINIC | Age: 69
End: 2024-07-22

## 2024-07-31 ENCOUNTER — APPOINTMENT (OUTPATIENT)
Dept: ORTHOPEDIC SURGERY | Facility: CLINIC | Age: 69
End: 2024-07-31
Payer: MEDICARE

## 2024-07-31 DIAGNOSIS — M54.16 RADICULOPATHY, LUMBAR REGION: ICD-10-CM

## 2024-07-31 DIAGNOSIS — M51.9 UNSPECIFIED THORACIC, THORACOLUMBAR AND LUMBOSACRAL INTERVERTEBRAL DISC DISORDER: ICD-10-CM

## 2024-07-31 PROCEDURE — 99213 OFFICE O/P EST LOW 20 MIN: CPT

## 2024-07-31 NOTE — IMAGING
[de-identified] : Spine:\par  Inspection/Palpation:\par  No tenderness to palpation throughout Cervical/thoracic/lumbar spine.\par  No bony stepoffs, No lesions.\par  \par  Gait:\par  Non-antalgic, able to perform bilateral toe and heel rise.  Able to perform tandem gait.  \par  \par  Range of Motion:\par  \par  Lumbar Spine: Flexion to 90 degrees, Extension to 30 degrees, rotation 30 degrees bilaterally, lateral flexion to 30 degrees bilaterally.\par  \par  Neurologic:\par  \par  Bilateral Lower Extremities 5/5 Iliopsoas/Quadriceps/Hamstrings/ Tibialis Anterior/ Gastrocnemius. 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

## 2024-07-31 NOTE — ASSESSMENT
[FreeTextEntry1] : 68 M with LBP and BLLE raidc C/w  PT FU 3 months if pain persists consider possible pain management referral for injection.  would like to hold off due to DM at this time.

## 2024-07-31 NOTE — HISTORY OF PRESENT ILLNESS
[Lower back] : lower back [Shooting] : shooting [Bending forward] : bending forward [Full time] : Work status: full time [de-identified] : 05/01/20254: requesting more meds/PT  01/04/2024: pain has worsen since last office visit.  Has not bene able to do PT since lat visit due to running out of benefits.   09/27/2023: here for fu, reports pain has worsened since last office visit. Patient has completed course of PT, exhausted Insurance benefits therefore transitioned to HEP. Would like RX for medical massage. found PT and massage most helpful.   68yo male presenting with lower back pain. Recently saw Cathleen Smith in  for shooting pain down the left leg. Pain mostly at night. No N/T/ weakness. Was severe for 2 weeks. Has adjusted sleep position which has helped. Continuing PT 2-3x/wk with relief. tried mobic/ Flexeril with minimal help.  [] : no [de-identified] : xray, MRI [de-identified] :

## 2024-08-22 ENCOUNTER — TRANSCRIPTION ENCOUNTER (OUTPATIENT)
Age: 69
End: 2024-08-22

## 2024-08-23 ENCOUNTER — TRANSCRIPTION ENCOUNTER (OUTPATIENT)
Age: 69
End: 2024-08-23

## 2024-08-27 ENCOUNTER — TRANSCRIPTION ENCOUNTER (OUTPATIENT)
Age: 69
End: 2024-08-27

## 2024-08-28 ENCOUNTER — NON-APPOINTMENT (OUTPATIENT)
Age: 69
End: 2024-08-28

## 2024-08-28 ENCOUNTER — TRANSCRIPTION ENCOUNTER (OUTPATIENT)
Age: 69
End: 2024-08-28

## 2024-08-28 DIAGNOSIS — R35.0 FREQUENCY OF MICTURITION: ICD-10-CM

## 2024-08-30 ENCOUNTER — TRANSCRIPTION ENCOUNTER (OUTPATIENT)
Age: 69
End: 2024-08-30

## 2024-09-03 ENCOUNTER — TRANSCRIPTION ENCOUNTER (OUTPATIENT)
Age: 69
End: 2024-09-03

## 2024-09-03 DIAGNOSIS — N40.1 BENIGN PROSTATIC HYPERPLASIA WITH LOWER URINARY TRACT SYMPMS: ICD-10-CM

## 2024-09-03 DIAGNOSIS — R35.1 BENIGN PROSTATIC HYPERPLASIA WITH LOWER URINARY TRACT SYMPMS: ICD-10-CM

## 2024-09-03 LAB
APPEARANCE: CLEAR
BACTERIA UR CULT: ABNORMAL
BACTERIA: NEGATIVE /HPF
BILIRUBIN URINE: NEGATIVE
BLOOD URINE: NEGATIVE
CAST: 0 /LPF
COLOR: NORMAL
EPITHELIAL CELLS: 1 /HPF
GLUCOSE QUALITATIVE U: NEGATIVE MG/DL
KETONES URINE: NEGATIVE MG/DL
LEUKOCYTE ESTERASE URINE: ABNORMAL
MICROSCOPIC-UA: NORMAL
NITRITE URINE: NEGATIVE
PH URINE: 6
PROTEIN URINE: NEGATIVE MG/DL
RED BLOOD CELLS URINE: 2 /HPF
SPECIFIC GRAVITY URINE: 1.02
UROBILINOGEN URINE: 1 MG/DL
WHITE BLOOD CELLS URINE: 2 /HPF

## 2024-09-03 RX ORDER — CEPHALEXIN 500 MG/1
500 TABLET ORAL
Qty: 10 | Refills: 0 | Status: ACTIVE | COMMUNITY
Start: 2024-09-03 | End: 1900-01-01

## 2024-09-04 ENCOUNTER — TRANSCRIPTION ENCOUNTER (OUTPATIENT)
Age: 69
End: 2024-09-04

## 2024-09-05 ENCOUNTER — TRANSCRIPTION ENCOUNTER (OUTPATIENT)
Age: 69
End: 2024-09-05

## 2024-09-05 ENCOUNTER — APPOINTMENT (OUTPATIENT)
Dept: ENDOCRINOLOGY | Facility: CLINIC | Age: 69
End: 2024-09-05
Payer: MEDICARE

## 2024-09-05 VITALS
DIASTOLIC BLOOD PRESSURE: 70 MMHG | WEIGHT: 158 LBS | HEIGHT: 64 IN | BODY MASS INDEX: 26.98 KG/M2 | RESPIRATION RATE: 16 BRPM | OXYGEN SATURATION: 97 % | SYSTOLIC BLOOD PRESSURE: 122 MMHG | HEART RATE: 92 BPM

## 2024-09-05 DIAGNOSIS — E78.5 HYPERLIPIDEMIA, UNSPECIFIED: ICD-10-CM

## 2024-09-05 DIAGNOSIS — E11.65 TYPE 2 DIABETES MELLITUS WITH HYPERGLYCEMIA: ICD-10-CM

## 2024-09-05 DIAGNOSIS — Z79.4 TYPE 2 DIABETES MELLITUS WITH HYPERGLYCEMIA: ICD-10-CM

## 2024-09-05 PROCEDURE — 95251 CONT GLUC MNTR ANALYSIS I&R: CPT

## 2024-09-05 PROCEDURE — 99214 OFFICE O/P EST MOD 30 MIN: CPT

## 2024-09-05 NOTE — ASSESSMENT
[Diabetes Foot Care] : diabetes foot care [Long Term Vascular Complications] : long term vascular complications of diabetes [Carbohydrate Consistent Diet] : carbohydrate consistent diet [Importance of Diet and Exercise] : importance of diet and exercise to improve glycemic control, achieve weight loss and improve cardiovascular health [Hypoglycemia Management] : hypoglycemia management [Action and use of Insulin] : action and use of short and long-acting insulin [Self Monitoring of Blood Glucose] : self monitoring of blood glucose [Injection Technique, Storage, Sharps Disposal] : injection technique, storage, and sharps disposal [Retinopathy Screening] : Patient was referred to ophthalmology for retinopathy screening [Diabetic Medications] : Risks and benefits of diabetic medications were discussed [FreeTextEntry1] : Type 2 DM Reviewed labs from 8/24/24, noted A1c is improved at 6.4%, which is at goal Noted he eliminated sugar free foods whihc seemed to have helped lower glucose throughout day Reviewed jim 2 sensor tracing today, noted TIR is 79%,17% high and 0% very high, noted early morning lows 3-6am, noted 4% lows.GMI is noted as 6.6% in past 14 days  Continue Lantus 35-40U qhs as early morning hypoglycemia resolved (pt prefers to use Lantus vials, advised to be cautious when drawing up insulin dosing to avoid potential errors and may also be contributing to variable sugars) Adjusted Novolog ISS TID ac to cover for postprandial hyperglycemia (10-16U at dinner) Will switch back to Trulicity as he noted better clinical improvement as compared to Ozempic  Continue Metformin 1000mg po daily Continue Trulicity 1.5mg weekly Continue Rosuvastatin 40mg daily for hyperlipidemia, noted as per recent labs from Aug 2024 total cholesterol is normal 148mg/dl, LDL at goal 68mg/dL  Noted normal urine Micro/Creat ratio in Aug 2024. No diabetic foot ulcers. Up to date with ophthalmology annual visit Given script for labs prior to next visit   Answered all questions today; patient verbalized understanding of the above RTO in 3 months w/ CDE and in 2025 once provider returns from maternity leave

## 2024-09-05 NOTE — HISTORY OF PRESENT ILLNESS
[Rachel] : Rachel [FreeTextEntry1] : This is a 68yo male who presents for diabetes follow up  Patient currently on Trulicity 1.5mg weekly,  Lantus 35-40 qhsnd using Novolog ISS (he estimates Novolog ISS: if glu  is150 14U, 200-16u, 250-22u, etc) He is up to date with ophthalmology annual visit. He prefers using insulin vials instead of pens. He is taking Atorvastatin 40mg daily for HLD. he has no new complaints, notes still has some low sugars at nighttime but has cut down on predinner Novolog and Lantus doses and notes incidence of lows is much lower. He has been working on diet and eliminated sugar free foods as well.  [Finger Stick] : Finger Stick: No [Hypoglycemia] : Patient is not hypoglycemic. [FreeTextEntry2] : 79 [FreeTextEntry3] : 17+0 [FreeTextEntry4] : 4+0 [de-identified] : 6.6 [FreeTextEntry5] : 137 [FreeTextEntry6] : 31.7

## 2024-10-01 ENCOUNTER — APPOINTMENT (OUTPATIENT)
Dept: UROLOGY | Facility: CLINIC | Age: 69
End: 2024-10-01
Payer: MEDICARE

## 2024-10-01 VITALS
HEIGHT: 64 IN | HEART RATE: 91 BPM | RESPIRATION RATE: 16 BRPM | OXYGEN SATURATION: 98 % | WEIGHT: 158 LBS | BODY MASS INDEX: 26.98 KG/M2 | SYSTOLIC BLOOD PRESSURE: 130 MMHG | DIASTOLIC BLOOD PRESSURE: 70 MMHG

## 2024-10-01 DIAGNOSIS — R35.1 BENIGN PROSTATIC HYPERPLASIA WITH LOWER URINARY TRACT SYMPMS: ICD-10-CM

## 2024-10-01 DIAGNOSIS — N40.1 BENIGN PROSTATIC HYPERPLASIA WITH LOWER URINARY TRACT SYMPMS: ICD-10-CM

## 2024-10-01 PROCEDURE — 99214 OFFICE O/P EST MOD 30 MIN: CPT

## 2024-10-01 PROCEDURE — G2211 COMPLEX E/M VISIT ADD ON: CPT

## 2024-10-01 RX ORDER — SULFAMETHOXAZOLE AND TRIMETHOPRIM 800; 160 MG/1; MG/1
800-160 TABLET ORAL
Qty: 14 | Refills: 0 | Status: ACTIVE | COMMUNITY
Start: 2024-10-01 | End: 1900-01-01

## 2024-10-01 NOTE — HISTORY OF PRESENT ILLNESS
[FreeTextEntry1] : 69-year-old male presents for follow-up  #LUTS / BPH / UTI 90cc prostate on MRI Has DM - HbA1c 6.4 Most bothered by nocturia x2-3, daytime frequency x8-10 times. + urgency with small volume incontinence (few drops). He also reports post-void dribbling, spraying of urine, and weak stream. Does think nocturia is better since starting finasteride Has TIMOTHY and wears a CPAP "most nights" On tamsulosin / finasteride DID have microscopic hematuria - cysto performed in OR - no lesions Treated for UTI approximately 1 month ago.  Still with mild dysuria PVR: 20 mL  #Elevated PSA PSA 5/2024 - 8.42, free PSA 1.71 PSA 4/2023 - 7.05, free PSA 1.40 (in setting of negative UA) No family of prostate cancer MRI prostate June 2024: 90 cc prostate, PI-RADS 3 Transperineal fusion biopsy June 2024: Benign tissue  #ED Problems for 25 years

## 2024-10-01 NOTE — ASSESSMENT
[FreeTextEntry1] : 69 y.o. M with  #Elevated PSA - s/p MRI, TP fusion biopsy June 2024 - negative - PSA in ~6 months  #BPH / LUTS / UTI - PVR low - Prostate 90cc - On tamsulosin and finasteride.  He is seeing some effect from finasteride.  Discussed can take up to 6 months (has been on for 3 months) -Did offer surgical procedures given enlarged prostate, UTI, bothersome urinary symptoms.  Based on size of prostate, would be aquablation or laser enucleation of the prostate.  Discussed risk and benefits of each.  He will think about this - UCx today

## 2024-10-01 NOTE — PHYSICAL EXAM
[Normal Appearance] : normal appearance [Edema] : no peripheral edema [] : no respiratory distress [Bowel Sounds] : normal bowel sounds [Abdomen Tenderness] : non-tender [Urethral Meatus] : meatus normal [Epididymis] : the epididymides were normal [Testes Tenderness] : no tenderness of the testes [Testes Mass (___cm)] : there were no testicular masses [Normal Station and Gait] : the gait and station were normal for the patient's age

## 2024-10-03 LAB — BACTERIA UR CULT: NORMAL

## 2024-10-30 ENCOUNTER — APPOINTMENT (OUTPATIENT)
Dept: ORTHOPEDIC SURGERY | Facility: CLINIC | Age: 69
End: 2024-10-30
Payer: MEDICARE

## 2024-10-30 VITALS — WEIGHT: 153 LBS | HEIGHT: 65 IN | BODY MASS INDEX: 25.49 KG/M2

## 2024-10-30 DIAGNOSIS — M54.16 RADICULOPATHY, LUMBAR REGION: ICD-10-CM

## 2024-10-30 PROCEDURE — 99213 OFFICE O/P EST LOW 20 MIN: CPT

## 2024-11-25 ENCOUNTER — RX RENEWAL (OUTPATIENT)
Age: 69
End: 2024-11-25

## 2024-11-27 ENCOUNTER — TRANSCRIPTION ENCOUNTER (OUTPATIENT)
Age: 69
End: 2024-11-27

## 2024-11-29 ENCOUNTER — RX RENEWAL (OUTPATIENT)
Age: 69
End: 2024-11-29

## 2024-11-29 LAB — BACTERIA UR CULT: NORMAL

## 2024-12-03 ENCOUNTER — TRANSCRIPTION ENCOUNTER (OUTPATIENT)
Age: 69
End: 2024-12-03

## 2024-12-11 ENCOUNTER — APPOINTMENT (OUTPATIENT)
Dept: ENDOCRINOLOGY | Facility: CLINIC | Age: 69
End: 2024-12-11
Payer: MEDICARE

## 2024-12-11 PROCEDURE — G0108 DIAB MANAGE TRN  PER INDIV: CPT

## 2024-12-11 PROCEDURE — 95251 CONT GLUC MNTR ANALYSIS I&R: CPT

## 2024-12-27 ENCOUNTER — TRANSCRIPTION ENCOUNTER (OUTPATIENT)
Age: 69
End: 2024-12-27

## 2024-12-27 ENCOUNTER — APPOINTMENT (OUTPATIENT)
Dept: GASTROENTEROLOGY | Facility: CLINIC | Age: 69
End: 2024-12-27
Payer: MEDICARE

## 2024-12-27 VITALS
HEART RATE: 100 BPM | WEIGHT: 158 LBS | BODY MASS INDEX: 26.33 KG/M2 | DIASTOLIC BLOOD PRESSURE: 51 MMHG | OXYGEN SATURATION: 98 % | SYSTOLIC BLOOD PRESSURE: 107 MMHG | HEIGHT: 65 IN

## 2024-12-27 DIAGNOSIS — K57.32 DIVERTICULITIS OF LARGE INTESTINE W/OUT PERFORATION OR ABSCESS W/OUT BLEEDING: ICD-10-CM

## 2024-12-27 DIAGNOSIS — K57.90 DIVERTICULOSIS OF INTESTINE, PART UNSPECIFIED, W/OUT PERFORATION OR ABSCESS W/OUT BLEEDING: ICD-10-CM

## 2024-12-27 DIAGNOSIS — K58.9 IRRITABLE BOWEL SYNDROME, UNSPECIFIED: ICD-10-CM

## 2024-12-27 PROCEDURE — 99214 OFFICE O/P EST MOD 30 MIN: CPT

## 2024-12-27 RX ORDER — CEPHALEXIN 500 MG/1
500 CAPSULE ORAL 3 TIMES DAILY
Qty: 30 | Refills: 3 | Status: ACTIVE | COMMUNITY
Start: 2024-12-27 | End: 1900-01-01

## 2024-12-30 ENCOUNTER — TRANSCRIPTION ENCOUNTER (OUTPATIENT)
Age: 69
End: 2024-12-30

## 2025-01-02 ENCOUNTER — TRANSCRIPTION ENCOUNTER (OUTPATIENT)
Age: 70
End: 2025-01-02

## 2025-01-13 ENCOUNTER — TRANSCRIPTION ENCOUNTER (OUTPATIENT)
Age: 70
End: 2025-01-13

## 2025-01-15 ENCOUNTER — APPOINTMENT (OUTPATIENT)
Dept: ORTHOPEDIC SURGERY | Facility: CLINIC | Age: 70
End: 2025-01-15
Payer: MEDICARE

## 2025-01-15 DIAGNOSIS — M54.16 RADICULOPATHY, LUMBAR REGION: ICD-10-CM

## 2025-01-15 DIAGNOSIS — M51.9 UNSPECIFIED THORACIC, THORACOLUMBAR AND LUMBOSACRAL INTERVERTEBRAL DISC DISORDER: ICD-10-CM

## 2025-01-15 PROCEDURE — 99213 OFFICE O/P EST LOW 20 MIN: CPT

## 2025-02-04 ENCOUNTER — RX RENEWAL (OUTPATIENT)
Age: 70
End: 2025-02-04

## 2025-02-24 ENCOUNTER — RX RENEWAL (OUTPATIENT)
Age: 70
End: 2025-02-24

## 2025-03-06 ENCOUNTER — APPOINTMENT (OUTPATIENT)
Dept: ENDOCRINOLOGY | Facility: CLINIC | Age: 70
End: 2025-03-06
Payer: MEDICARE

## 2025-03-06 PROCEDURE — 95251 CONT GLUC MNTR ANALYSIS I&R: CPT

## 2025-03-06 PROCEDURE — G0108 DIAB MANAGE TRN  PER INDIV: CPT

## 2025-03-06 RX ORDER — SEMAGLUTIDE 1.34 MG/ML
4 INJECTION, SOLUTION SUBCUTANEOUS
Qty: 3 | Refills: 3 | Status: ACTIVE | COMMUNITY
Start: 2025-03-06 | End: 1900-01-01

## 2025-04-01 ENCOUNTER — APPOINTMENT (OUTPATIENT)
Dept: UROLOGY | Facility: CLINIC | Age: 70
End: 2025-04-01
Payer: MEDICARE

## 2025-04-01 DIAGNOSIS — N20.0 CALCULUS OF KIDNEY: ICD-10-CM

## 2025-04-01 DIAGNOSIS — R93.5 ABNORMAL FINDINGS ON DIAGNOSTIC IMAGING OF OTHER ABDOMINAL REGIONS, INCLUDING RETROPERITONEUM: ICD-10-CM

## 2025-04-01 DIAGNOSIS — N40.1 BENIGN PROSTATIC HYPERPLASIA WITH LOWER URINARY TRACT SYMPMS: ICD-10-CM

## 2025-04-01 DIAGNOSIS — R35.1 BENIGN PROSTATIC HYPERPLASIA WITH LOWER URINARY TRACT SYMPMS: ICD-10-CM

## 2025-04-01 PROCEDURE — G2211 COMPLEX E/M VISIT ADD ON: CPT

## 2025-04-01 PROCEDURE — 99214 OFFICE O/P EST MOD 30 MIN: CPT

## 2025-04-02 LAB
APPEARANCE: CLEAR
BACTERIA: NEGATIVE /HPF
BILIRUBIN URINE: NEGATIVE
BLOOD URINE: ABNORMAL
CAST: 0 /LPF
COLOR: YELLOW
EPITHELIAL CELLS: 0 /HPF
GLUCOSE QUALITATIVE U: 500 MG/DL
KETONES URINE: ABNORMAL MG/DL
LEUKOCYTE ESTERASE URINE: NEGATIVE
MICROSCOPIC-UA: NORMAL
NITRITE URINE: NEGATIVE
PH URINE: 6
PROTEIN URINE: NORMAL MG/DL
PSA FREE FLD-MCNC: 12 %
PSA FREE SERPL-MCNC: 0.69 NG/ML
PSA SERPL-MCNC: 5.71 NG/ML
RED BLOOD CELLS URINE: 35 /HPF
SPECIFIC GRAVITY URINE: 1.03
UROBILINOGEN URINE: 0.2 MG/DL
WHITE BLOOD CELLS URINE: 1 /HPF

## 2025-05-05 ENCOUNTER — RX RENEWAL (OUTPATIENT)
Age: 70
End: 2025-05-05

## 2025-05-05 ENCOUNTER — TRANSCRIPTION ENCOUNTER (OUTPATIENT)
Age: 70
End: 2025-05-05

## 2025-05-12 ENCOUNTER — RX RENEWAL (OUTPATIENT)
Age: 70
End: 2025-05-12

## 2025-05-16 ENCOUNTER — APPOINTMENT (OUTPATIENT)
Dept: ORTHOPEDIC SURGERY | Facility: CLINIC | Age: 70
End: 2025-05-16
Payer: MEDICARE

## 2025-05-16 VITALS — WEIGHT: 158 LBS | BODY MASS INDEX: 26.33 KG/M2 | HEIGHT: 65 IN

## 2025-05-16 DIAGNOSIS — M51.9 UNSPECIFIED THORACIC, THORACOLUMBAR AND LUMBOSACRAL INTERVERTEBRAL DISC DISORDER: ICD-10-CM

## 2025-05-16 DIAGNOSIS — M54.16 RADICULOPATHY, LUMBAR REGION: ICD-10-CM

## 2025-05-16 DIAGNOSIS — M47.812 SPONDYLOSIS W/OUT MYELOPATHY OR RADICULOPATHY, CERVICAL REGION: ICD-10-CM

## 2025-05-16 PROCEDURE — 99213 OFFICE O/P EST LOW 20 MIN: CPT

## 2025-05-16 PROCEDURE — 72110 X-RAY EXAM L-2 SPINE 4/>VWS: CPT

## 2025-05-19 ENCOUNTER — RX RENEWAL (OUTPATIENT)
Age: 70
End: 2025-05-19

## 2025-05-22 ENCOUNTER — APPOINTMENT (OUTPATIENT)
Dept: INTERNAL MEDICINE | Facility: CLINIC | Age: 70
End: 2025-05-22

## 2025-06-18 ENCOUNTER — APPOINTMENT (OUTPATIENT)
Dept: ORTHOPEDIC SURGERY | Facility: CLINIC | Age: 70
End: 2025-06-18
Payer: MEDICARE

## 2025-06-18 VITALS — BODY MASS INDEX: 25.83 KG/M2 | WEIGHT: 155 LBS | HEIGHT: 65 IN

## 2025-06-18 PROCEDURE — 99213 OFFICE O/P EST LOW 20 MIN: CPT

## 2025-06-25 ENCOUNTER — APPOINTMENT (OUTPATIENT)
Dept: ORTHOPEDIC SURGERY | Facility: CLINIC | Age: 70
End: 2025-06-25
Payer: MEDICARE

## 2025-06-25 VITALS — WEIGHT: 167 LBS | BODY MASS INDEX: 27.82 KG/M2 | HEIGHT: 65 IN

## 2025-06-25 PROBLEM — M21.41 PES PLANUS OF BOTH FEET: Status: ACTIVE | Noted: 2025-06-25

## 2025-06-25 PROBLEM — M21.70 LEG LENGTH DISCREPANCY: Status: ACTIVE | Noted: 2025-06-25

## 2025-06-25 PROCEDURE — 99214 OFFICE O/P EST MOD 30 MIN: CPT

## 2025-06-25 PROCEDURE — 73630 X-RAY EXAM OF FOOT: CPT | Mod: 50

## 2025-07-03 ENCOUNTER — TRANSCRIPTION ENCOUNTER (OUTPATIENT)
Age: 70
End: 2025-07-03

## 2025-07-14 ENCOUNTER — TRANSCRIPTION ENCOUNTER (OUTPATIENT)
Age: 70
End: 2025-07-14

## 2025-07-17 ENCOUNTER — APPOINTMENT (OUTPATIENT)
Dept: ENDOCRINOLOGY | Facility: CLINIC | Age: 70
End: 2025-07-17
Payer: MEDICARE

## 2025-07-17 PROBLEM — E11.9 TYPE 2 DIABETES MELLITUS WITHOUT COMPLICATION, UNSPECIFIED WHETHER LONG TERM INSULIN USE: Status: ACTIVE | Noted: 2025-06-25

## 2025-07-17 PROCEDURE — 99214 OFFICE O/P EST MOD 30 MIN: CPT

## 2025-07-17 PROCEDURE — 95251 CONT GLUC MNTR ANALYSIS I&R: CPT

## 2025-07-18 ENCOUNTER — APPOINTMENT (OUTPATIENT)
Dept: ORTHOPEDIC SURGERY | Facility: CLINIC | Age: 70
End: 2025-07-18

## 2025-07-21 ENCOUNTER — APPOINTMENT (OUTPATIENT)
Dept: INTERNAL MEDICINE | Facility: CLINIC | Age: 70
End: 2025-07-21
Payer: MEDICARE

## 2025-07-21 VITALS
RESPIRATION RATE: 15 BRPM | HEART RATE: 91 BPM | OXYGEN SATURATION: 97 % | TEMPERATURE: 98.4 F | WEIGHT: 169 LBS | BODY MASS INDEX: 28.16 KG/M2 | HEIGHT: 65 IN

## 2025-07-21 VITALS — DIASTOLIC BLOOD PRESSURE: 72 MMHG | SYSTOLIC BLOOD PRESSURE: 118 MMHG

## 2025-07-21 DIAGNOSIS — Z79.4 TYPE 2 DIABETES MELLITUS WITH HYPERGLYCEMIA: ICD-10-CM

## 2025-07-21 DIAGNOSIS — E11.65 TYPE 2 DIABETES MELLITUS WITH HYPERGLYCEMIA: ICD-10-CM

## 2025-07-21 DIAGNOSIS — R25.1 TREMOR, UNSPECIFIED: ICD-10-CM

## 2025-07-21 DIAGNOSIS — F32.A DEPRESSION, UNSPECIFIED: ICD-10-CM

## 2025-07-21 DIAGNOSIS — G47.33 OBSTRUCTIVE SLEEP APNEA (ADULT) (PEDIATRIC): ICD-10-CM

## 2025-07-21 DIAGNOSIS — I10 ESSENTIAL (PRIMARY) HYPERTENSION: ICD-10-CM

## 2025-07-21 DIAGNOSIS — E78.5 HYPERLIPIDEMIA, UNSPECIFIED: ICD-10-CM

## 2025-07-21 PROCEDURE — G2211 COMPLEX E/M VISIT ADD ON: CPT

## 2025-07-21 PROCEDURE — 99214 OFFICE O/P EST MOD 30 MIN: CPT

## 2025-07-24 RX ORDER — INSULIN GLARGINE 100 [IU]/ML
100 INJECTION, SOLUTION SUBCUTANEOUS AT BEDTIME
Qty: 3 | Refills: 2 | Status: ACTIVE | COMMUNITY
Start: 2025-07-24 | End: 1900-01-01

## 2025-07-31 ENCOUNTER — TRANSCRIPTION ENCOUNTER (OUTPATIENT)
Age: 70
End: 2025-07-31

## 2025-08-01 ENCOUNTER — TRANSCRIPTION ENCOUNTER (OUTPATIENT)
Age: 70
End: 2025-08-01

## 2025-09-17 ENCOUNTER — APPOINTMENT (OUTPATIENT)
Dept: ORTHOPEDIC SURGERY | Facility: CLINIC | Age: 70
End: 2025-09-17
Payer: MEDICARE

## 2025-09-17 DIAGNOSIS — M77.8 OTHER ENTHESOPATHIES, NOT ELSEWHERE CLASSIFIED: ICD-10-CM

## 2025-09-17 DIAGNOSIS — M47.812 SPONDYLOSIS W/OUT MYELOPATHY OR RADICULOPATHY, CERVICAL REGION: ICD-10-CM

## 2025-09-17 DIAGNOSIS — M54.16 RADICULOPATHY, LUMBAR REGION: ICD-10-CM

## 2025-09-17 DIAGNOSIS — M51.9 UNSPECIFIED THORACIC, THORACOLUMBAR AND LUMBOSACRAL INTERVERTEBRAL DISC DISORDER: ICD-10-CM

## 2025-09-17 PROCEDURE — 99213 OFFICE O/P EST LOW 20 MIN: CPT

## (undated) DEVICE — BALLOON ENDOCAVITY 2X14CM

## (undated) DEVICE — SOL IRR BAG NS 0.9% 3000ML

## (undated) DEVICE — NDL SPINAL 20G X 6" QUINCKE

## (undated) DEVICE — WARMING BLANKET UPPER ADULT

## (undated) DEVICE — LABELS BLANK W PEN

## (undated) DEVICE — Device

## (undated) DEVICE — NDL MAX CORE 18G X 25CM

## (undated) DEVICE — PREP CHLORAPREP HI-LITE ORANGE 3ML

## (undated) DEVICE — DRSG TELFA 3 X 8

## (undated) DEVICE — PREP BETADINE KIT

## (undated) DEVICE — DRAPE BACK TABLE COVER 44X90"

## (undated) DEVICE — BASIN SET DOUBLE

## (undated) DEVICE — GRID BRACHYTHERAPY EZ 18G

## (undated) DEVICE — GLV 7.5 PROTEXIS (WHITE)

## (undated) DEVICE — PACK CYSTO

## (undated) DEVICE — SYR LUER LOK 20CC

## (undated) DEVICE — CATH ANGIO 14G X 3.25"

## (undated) DEVICE — POSITIONER FOAM EGG CRATE ULNAR 2PCS (PINK)

## (undated) DEVICE — TUBING TUR 2 PRONG

## (undated) DEVICE — VENODYNE/SCD SLEEVE CALF MEDIUM

## (undated) DEVICE — SOL IRR POUR H2O 500ML

## (undated) DEVICE — NEOGUARD ENDOCAVITY PROBE COVER 1 X 11.8"

## (undated) DEVICE — POSITIONER PATIENT SAFETY STRAP 3X60"